# Patient Record
Sex: MALE | Race: WHITE | NOT HISPANIC OR LATINO | Employment: FULL TIME | URBAN - METROPOLITAN AREA
[De-identification: names, ages, dates, MRNs, and addresses within clinical notes are randomized per-mention and may not be internally consistent; named-entity substitution may affect disease eponyms.]

---

## 2017-02-10 ENCOUNTER — HOSPITAL ENCOUNTER (EMERGENCY)
Facility: HOSPITAL | Age: 27
Discharge: HOME | End: 2017-02-10
Attending: EMERGENCY MEDICINE | Admitting: EMERGENCY MEDICINE

## 2017-02-10 VITALS
HEART RATE: 76 BPM | RESPIRATION RATE: 18 BRPM | SYSTOLIC BLOOD PRESSURE: 122 MMHG | WEIGHT: 155 LBS | BODY MASS INDEX: 22.19 KG/M2 | DIASTOLIC BLOOD PRESSURE: 75 MMHG | HEIGHT: 70 IN | TEMPERATURE: 98.6 F | OXYGEN SATURATION: 96 %

## 2017-02-10 DIAGNOSIS — R56.9 GENERALIZED SEIZURE (HCC): Primary | ICD-10-CM

## 2017-02-10 PROCEDURE — 99284 EMERGENCY DEPT VISIT MOD MDM: CPT

## 2017-02-10 RX ORDER — LAMOTRIGINE 25 MG/1
TABLET ORAL
Qty: 140 TABLET | Refills: 0 | Status: SHIPPED | OUTPATIENT
Start: 2017-02-10 | End: 2017-02-24 | Stop reason: SDUPTHER

## 2017-02-10 RX ORDER — LAMOTRIGINE 25 MG/1
50 TABLET ORAL ONCE
Status: COMPLETED | OUTPATIENT
Start: 2017-02-10 | End: 2017-02-10

## 2017-02-10 RX ADMIN — LAMOTRIGINE 50 MG: 25 TABLET ORAL at 11:31

## 2017-02-10 NOTE — DISCHARGE INSTRUCTIONS
Return to the ED for any new or worsening concerns.  Follow up with Dr. Meng.   No driving for 90 days. No alcohol. Avoid stimulants.   Take your medications as prescribed.

## 2017-02-10 NOTE — ED PROVIDER NOTES
Subjective   HPI Comments: Richie Alvares is a 26 y.o.male who presents to the ED s/p seizure. Patient has hx seizure disorder for which he sees Dr. Meng, with his last appointment in July 2015. He states that his last seizure prior to today was 4 years ago and that he was previously on lamictal but stopped 1 year ago because he had not had a seizure in years. He reports that his seizures were previously triggered by stress and alcohol, but denies any alcoholism.  Last night, he went out drinking with friends. This morning, he awoke hungover and went to work, where he had a seizure. He states that he had difficulty focusing and felt light-headed prior to the episode. His coworkers described the patient during the episode as having his eyes closed, with head deviated to the left and right arm stiffened in the air, and jerking. He denies any other acute sx at this time.     Patient is a 26 y.o. male presenting with seizures.   History provided by:  Patient  Seizures   Seizure activity on arrival: no    Preceding symptoms: dizziness    Initial focality:  None  Episode characteristics: generalized shaking and stiffening    Return to baseline: yes    Timing:  Once  Number of seizures this episode:  1  PTA treatment:  None  History of seizures: yes    Date of most recent prior episode:  4 years ago  Severity:  Mild  Current therapy:  None      Review of Systems   Constitutional: Negative for chills, fatigue and fever.   HENT: Negative for congestion and sore throat.    Respiratory: Negative for shortness of breath.    Cardiovascular: Negative for chest pain.   Gastrointestinal: Negative for abdominal pain.   Genitourinary: Negative for dysuria.   Musculoskeletal: Negative for back pain.   Skin: Negative for rash.   Neurological: Positive for seizures and light-headedness. Negative for headaches.   Psychiatric/Behavioral: Negative for agitation and confusion.   All other systems reviewed and are negative.      Past  "Medical History   Diagnosis Date   • Seizures        Allergies   Allergen Reactions   • No Known Drug Allergy        History reviewed. No pertinent past surgical history.    History reviewed. No pertinent family history.    Social History     Social History   • Marital status: Single     Spouse name: N/A   • Number of children: N/A   • Years of education: N/A     Social History Main Topics   • Smoking status: Never Smoker   • Smokeless tobacco: None   • Alcohol use Yes      Comment: 1-2 a week or less   • Drug use: Yes     Special: Marijuana      Comment: \"NOT RECENTLY\"   • Sexual activity: Not Asked     Other Topics Concern   • None     Social History Narrative   • None         Objective   Physical Exam   Constitutional: He is oriented to person, place, and time. He appears well-developed and well-nourished. No distress.   HENT:   Head: Normocephalic and atraumatic.   Eyes: Conjunctivae are normal.   Neck: Trachea normal, normal range of motion and phonation normal. Neck supple. No JVD present.   Cardiovascular: Normal rate, regular rhythm and normal heart sounds.    Pulmonary/Chest: Effort normal and breath sounds normal. No respiratory distress.   Abdominal: Soft. There is no tenderness.   Musculoskeletal: Normal range of motion. He exhibits no edema.   Neurological: He is alert and oriented to person, place, and time. He has normal strength. Coordination normal.   Skin: Skin is warm and dry. He is not diaphoretic. No pallor.   Psychiatric: He has a normal mood and affect. His speech is normal and behavior is normal.   Nursing note and vitals reviewed.      Procedures         ED Course  ED Course   Comment By Time   Discussed case in detail with Dr. Meng, neurologist, who recommends restarting lamictal. JOVAN Orozco 02/10 1039       No results found for this or any previous visit (from the past 24 hour(s)).  Note: In addition to lab results from this visit, the labs listed above may include labs " taken at another facility or during a different encounter within the last 24 hours. Please correlate lab times with ED admission and discharge times for further clarification of the services performed during this visit.    No orders to display     Vitals:    02/10/17 1015 02/10/17 1030 02/10/17 1045 02/10/17 1100   BP: 126/71 117/79 109/53 122/75   Patient Position:       Pulse: 102 92 84 76   Resp:       Temp:       TempSrc:       SpO2: 99% 96% 97% 96%   Weight:       Height:         Medications   lamoTRIgine (LaMICtal) tablet 50 mg (50 mg Oral Given 2/10/17 1131)     ECG/EMG Results (last 24 hours)     ** No results found for the last 24 hours. **                      MDM    Final diagnoses:   Generalized seizure       Documentation assistance provided by tuan Orozco.  Information recorded by the tuan was done at my direction and has been verified and validated by me.     Komal Orozco  02/10/17 1025       Komal Orozco  02/10/17 1028       Komal Orozco  02/10/17 1045       Komal Orozco  02/10/17 1122       Delio Chambers MD  02/11/17 1900

## 2017-02-22 ENCOUNTER — OFFICE VISIT (OUTPATIENT)
Dept: NEUROLOGY | Facility: CLINIC | Age: 27
End: 2017-02-22

## 2017-02-22 VITALS
HEIGHT: 70 IN | BODY MASS INDEX: 21.33 KG/M2 | SYSTOLIC BLOOD PRESSURE: 118 MMHG | WEIGHT: 149 LBS | DIASTOLIC BLOOD PRESSURE: 60 MMHG

## 2017-02-22 DIAGNOSIS — G40.409 GRAND MAL SEIZURE DISORDER (HCC): Primary | ICD-10-CM

## 2017-02-22 PROCEDURE — 99213 OFFICE O/P EST LOW 20 MIN: CPT | Performed by: PHYSICIAN ASSISTANT

## 2017-02-22 NOTE — PROGRESS NOTES
Subjective     History of Present Illness   Pt with first seizure on 03/06/2013. After this he was seen by Dr. Ervin at  and underwent a brain MRI; reviewed disc from  from 3/22/13; agree normal. EEG at [facility] on 03/13/2013 was read as abnormal showing generalized spike and wave discharges became in bursts lasting less than 2 seconds. Recent CBC likewise was unremarkable.  The seizure occurred at work in the setting of recent increased stress and sleep deprivation (about 4 hours sleep). He recalls sitting at his desk working and recalls having a little trouble processing, and for example typing. Has had that feeling few times in past, when he was dehydrated and nearly passed out after running. The next thing he recalls is people talking to him and being loaded into ambulance. Seemed fine when seen in ER by family. The day prior to the seizure he had drunk about 24 ounces of beer which was not unusual for him. No TB or incontinence. Not really sore next day, but tired.   Born full term, no complications of pregnancy or delivery. When about [age], had very high fever, but no convulsion. No TBI with LOC. No CNS infection. No known family history of seizure, but father's side slightly less well known.  Started Dilantin, then planned to start LTG. no morning myoclonus.  At visit 10/8/13 was on LTG XR 400mg and tolerating well. Blood level 10/7/13 was 10. Very expensive.  Doing fine, and can't tell he's on the med. Reported no seizures or suspicion of seizures.  Switched to Lamictal 200 mg bid due to cost.  9/11/14: Bid dosing much cheaper. No problems with side effects. Good compliance -- misses no more than once/month. No seizure or prodrome.   Planning to travel for several months in . Noted: Doing well, great compliance. No change meds.  7/15: doing good. No seizures. [place] like the LTG at higher dose was keeping mind running, and so cut back dose to 200mg, after forgetting dose couple days then taking  "dose and noticing SEs. Takes this immediate release tab in the morning. Seizures have occurred in the very early morning hours. Does not want to go off medicine at this time but prefers this lower dose. Reports much healthier lifestyle now with good sleep and exercise and no excessive alcohol.      Today: Mr. Alvares notes that he had been seizure free for approximately four years and had discontinued Lamictal around one year ago with no issue. On 2/10, he was seen at [facility] ED following a recurrent seizure. He notes that he had difficulty focusing and felt light headed at work prior to this episode. He then had decreased consciousness. His co-workers noted associated right arm stiffening and convulsions. He noted associated fatigue following this episode. He denies associated urinary incontinence or tongue biting. He is unsure how long the episode lasted. Additionally, he noted that he had gone out with friends drinking the night before. He was restarted on Lamictal at the ED and is now titrating up to 100mg BID. He is tolerating this well.         The following portions of the patient's history were reviewed and updated as appropriate: allergies, current medications, past family history, past medical history, past social history, past surgical history and problem list.    Review of Systems   Constitutional: Negative.    HENT: Negative.    Eyes: Negative.    Respiratory: Negative.    Cardiovascular: Negative.    Gastrointestinal: Negative.    Endocrine: Negative.    Genitourinary: Negative.    Musculoskeletal: Negative.    Skin: Negative.    Allergic/Immunologic: Negative.    Hematological: Negative.    Psychiatric/Behavioral: Negative.        Objective     Visit Vitals   • /60   • Ht 70\" (177.8 cm)   • Wt 149 lb (67.6 kg)   • BMI 21.38 kg/m2       General appearance today is normal.         Physical Exam   Neurological: He has normal strength. He has a normal Finger-Nose-Finger Test.   Psychiatric: His speech is " normal.        Neurologic Exam     Mental Status   Attention: normal.   Speech: speech is normal   Level of consciousness: alert  Knowledge: good.   Normal comprehension.     Cranial Nerves   Cranial nerves II through XII intact.     Motor Exam   Muscle bulk: normal  Overall muscle tone: normal    Strength   Strength 5/5 throughout.     Sensory Exam   Light touch normal.     Gait, Coordination, and Reflexes     Coordination   Finger to nose coordination: normal    Tremor   Resting tremor: absent        Assessment/Plan   Richie was seen today for seizures.    Diagnoses and all orders for this visit:    Grand mal seizure disorder          Discussion/Summary   Richie Alvares returns to clinic today with a history of epilepsy. I again reviewed his current status and treatment options. After discussing potential treatment options, it was elected to continue on Lamictal, titrating up to 100mg BID. He will be unable to drive for 90 days following seizure per the [place] driving law. He will then follow up in 6 months, or sooner if needed.       I spent 15 minutes with the patient. I spent 75 percent of this time counseling and discussing diagnosis, prognosis, diagnostic testing, evaluation, treatment options and management.    As part of this visit I discussed the history with the patient .      Sharon Tom PA-C

## 2017-02-24 ENCOUNTER — TELEPHONE (OUTPATIENT)
Dept: NEUROLOGY | Facility: CLINIC | Age: 27
End: 2017-02-24

## 2017-02-24 RX ORDER — LAMOTRIGINE 25 MG/1
100 TABLET ORAL 2 TIMES DAILY
Qty: 60 TABLET | Refills: 11 | Status: SHIPPED | OUTPATIENT
Start: 2017-02-24 | End: 2018-10-03

## 2017-02-24 NOTE — TELEPHONE ENCOUNTER
Called and lvm with pt letting him know that Sharon had called in the script for Lamictal 100 mgs to be taken BID but to make sure he is titrated up before he starts the 100 mgs and follow hospital orders.

## 2017-02-24 NOTE — TELEPHONE ENCOUNTER
----- Message from Sharon Tom PA-C sent at 2/24/2017 12:01 PM EST -----  Regarding: RE: medication correction  I sent in 100mg BID. Make sure to let him know to only begin taking this dose after he is fulling titrated up to this amount. Thanks!      ----- Message -----     From: Leora Tyler CMA     Sent: 2/24/2017  10:56 AM       To: Sharon Tom PA-C  Subject: medication correction                                ----- Message -----     From: Dale Watson MA     Sent: 2/24/2017  10:54 AM       To: Leora Tyler CMA    Pt called. Stated that a prescription was to be called into [facility] pharmacy. He is currently taking 25mg 4tabs BID.     Pt would like CB once this has been done    Thanks

## 2017-02-28 ENCOUNTER — TELEPHONE (OUTPATIENT)
Dept: NEUROLOGY | Facility: CLINIC | Age: 27
End: 2017-02-28

## 2017-02-28 NOTE — TELEPHONE ENCOUNTER
Pt called and states lamotrigine 100 mg BID Rx wasn't received by [facility] pharmacy. [facility]

## 2017-03-29 RX ORDER — LEVETIRACETAM 500 MG/1
TABLET ORAL
Qty: 60 TABLET | Refills: 3 | Status: SHIPPED | OUTPATIENT
Start: 2017-03-29 | End: 2017-08-08 | Stop reason: SDUPTHER

## 2017-03-30 ENCOUNTER — TELEPHONE (OUTPATIENT)
Dept: NEUROLOGY | Facility: CLINIC | Age: 27
End: 2017-03-30

## 2017-03-30 NOTE — TELEPHONE ENCOUNTER
Spoke with patient and faxed over the titration schedule to him. Patient to call with any questions about changing medications. ADRIEL

## 2017-03-30 NOTE — TELEPHONE ENCOUNTER
"----- Message from Geneva Meng MD sent at 3/29/2017  4:25 PM EDT -----  Regarding: RE: Medication issue  Titration written out, script sent in  ----- Message -----     From: Gisselle Cisneros CMA     Sent: 3/29/2017  11:13 AM       To: Geneva Meng MD  Subject: RE: Medication issue                             Patient called back and stated he had not been on Keppra before. I explained that a new prescription would be sent in and that we could fax him over the titration schedule to follow. AG   ----- Message -----     From: Geneva Meng MD     Sent: 3/28/2017   8:59 AM       To: Gisselle Cisneros CMA  Subject: RE: Medication issue                             We can change medications. As far as I know, he has never taken Keppra. Can you check with him if this is correct? If so, I will send in script and write out titration schedule.  ----- Message -----     From: Gisselle Cisneros CMA     Sent: 3/27/2017   1:25 PM       To: Geneva Meng MD  Subject: Medication issue                                 Having mood swings ( up and down) \"on edge\" seems like. Denies depression or suicidal feelings. Feels \"off\" wants to know if there is another medication option for him. He has had the same problem with Lamictal in the past.          [phone]        "

## 2017-08-08 RX ORDER — LEVETIRACETAM 500 MG/1
TABLET ORAL
Qty: 60 TABLET | Refills: 3 | Status: SHIPPED | OUTPATIENT
Start: 2017-08-08 | End: 2017-10-03 | Stop reason: SDUPTHER

## 2017-10-03 ENCOUNTER — OFFICE VISIT (OUTPATIENT)
Dept: NEUROLOGY | Facility: CLINIC | Age: 27
End: 2017-10-03

## 2017-10-03 VITALS
WEIGHT: 150 LBS | SYSTOLIC BLOOD PRESSURE: 120 MMHG | HEIGHT: 70 IN | DIASTOLIC BLOOD PRESSURE: 75 MMHG | BODY MASS INDEX: 21.47 KG/M2

## 2017-10-03 DIAGNOSIS — G40.309 EPILEPSY, GENERALIZED, CONVULSIVE (HCC): Primary | ICD-10-CM

## 2017-10-03 PROCEDURE — 99214 OFFICE O/P EST MOD 30 MIN: CPT | Performed by: PSYCHIATRY & NEUROLOGY

## 2017-10-03 RX ORDER — LEVETIRACETAM 500 MG/1
TABLET ORAL
Qty: 60 TABLET | Refills: 11 | Status: SHIPPED | OUTPATIENT
Start: 2017-10-03 | End: 2018-07-11 | Stop reason: SDUPTHER

## 2017-10-03 NOTE — PROGRESS NOTES
Subjective   Richie Alvares is a 27 y.o. male.     History of Present Illness   Pt with first seizure on 03/06/2013. First seen by Dr. Ervin at . Brain MRI reviewed disc from 3/13: normal. EEG at [facility] on 03/13/2013 was read as abnormal showing generalized spike and wave discharges in bursts lasting less than 2 seconds.   The seizure occurred in setting of recent increased stress and sleep deprivation. Immediately before recalls having a little trouble processing. Has had that feeling few times in past, when he was dehydrated and nearly passed out after running. Day prior to the seizure he had drunk about 24 ounces of beer which was not unusual for him. No TB or incontinence.  Born full term, no complications of pregnancy or delivery. When about [age], had very high fever, but no convulsion. No TBI with LOC. No CNS infection. No known family history of seizure, but father's side slightly less well known.  Started Dilantin, then planned to start LTG. no morning myoclonus.  At visit 10/8/13 was on LTG XR 400mg and tolerating well. Blood level 10/7/13 was 10. Very expensive. Switched to Lamictal 200 mg bid due to cost.  9/11/14: Good compliance -- misses no more than once/month. No seizure or prodrome.   7/15: No seizures. Felt like the LTG at higher dose was keeping mind running, and so cut back dose to 200mg, after forgetting dose couple days then taking dose and noticing SEs. Takes this immediate release tab in the morning. Seizures have occurred in the very early morning hours. Does not want to go off medicine at this time but prefers this lower dose. Reports much healthier lifestyle now with good sleep and exercise and no excessive alcohol.    2/17: was seizure free for approximately four years and had discontinued Lamictal around one year ago with no issue. On 2/10, he was seen at [facility] ED following a recurrent seizure. He notes that he had difficulty focusing and felt light headed at work prior to this  "episode. Co-workers noted associated right arm stiffening and convulsions, duration unclear. Additionally, he noted that he had gone out with friends drinking the night before. He was restarted on Lamictal at the ED and titrating up to 100mg BID.  Today: phoned in March, mood swings on LTG, planned change to LVT. Doing well with LVT 500mg bid. No seizures but as in past, couple times after running and dehydrated felt hard to think, but not like before seizure. General health has been OK. Has been working out a lot. \"Tweaked\" back with exercise recently. Takes fish oil and OTC joint med bid so feels he can deal with bid dosing of LVT. Denies any significant mood or other SEs.     The following portions of the patient's history were reviewed and updated as appropriate: allergies, current medications, past family history, past medical history, past social history, past surgical history and problem list.    Review of Systems   Constitutional: Negative for fever and unexpected weight change.   Respiratory: Negative for cough and shortness of breath.    Cardiovascular: Negative for chest pain.   Musculoskeletal: Positive for back pain.   Neurological: Negative for seizures.   Psychiatric/Behavioral: Negative for confusion and dysphoric mood. The patient is not nervous/anxious.        Objective   Physical Exam   Constitutional: He is oriented to person, place, and time. He appears well-developed and well-nourished.   HENT:   Head: Normocephalic and atraumatic.   Eyes: EOM are normal. Pupils are equal, round, and reactive to light.   Pulmonary/Chest: Effort normal.   Musculoskeletal: Normal range of motion.   Neurological: He is oriented to person, place, and time. He has a normal Finger-Nose-Finger Test and a normal Heel to Shin Test. Gait normal.   Skin: Skin is warm and dry.   Psychiatric: His speech is normal.   Nursing note and vitals reviewed.      Neurologic Exam     Mental Status   Oriented to person, place, and " time.   Speech: speech is normal   Level of consciousness: alert  Knowledge: consistent with education.   Able to name object. Normal comprehension.     Cranial Nerves     CN II   Visual fields full to confrontation.     CN III, IV, VI   Pupils are equal, round, and reactive to light.  Extraocular motions are normal.     CN VII   Facial expression full, symmetric.     CN IX, X   CN IX normal.   CN X normal.   Palate: symmetric    CN XI   CN XI normal.     CN XII   CN XII normal.     Motor Exam   Muscle bulk: normal  Right arm pronator drift: absent  Left arm pronator drift: absent    Strength   Strength 5/5 except as noted.     Sensory Exam   Light touch normal.     Gait, Coordination, and Reflexes     Gait  Gait: normal    Coordination   Finger to nose coordination: normal  Heel to shin coordination: normal    Tremor   Resting tremor: absent  Intention tremor: absent  Action tremor: absent      Assessment/Plan   Richie was seen today for seizures.    Diagnoses and all orders for this visit:    Epilepsy, generalized, convulsive    Other orders  -     levETIRAcetam (KEPPRA) 500 MG tablet; Take one tab twice daily    Discussion/Summary:  Doing well. Long discussion re: compliance, med alternatives, mood effects of AEDs, seizure classification and prognosis, plan for sleep deprived EEG before taper in future, carrying LVT with, sx of prodrome and response, etc.  25 minutes face to face, 15 minutes spent in discussion as above.   Return in about 1 year (around 10/3/2018).

## 2018-07-11 ENCOUNTER — TELEPHONE (OUTPATIENT)
Dept: NEUROLOGY | Facility: CLINIC | Age: 28
End: 2018-07-11

## 2018-07-11 RX ORDER — LEVETIRACETAM 500 MG/1
TABLET ORAL
Qty: 180 TABLET | Refills: 1 | Status: SHIPPED | OUTPATIENT
Start: 2018-07-11 | End: 2018-10-03 | Stop reason: SDUPTHER

## 2018-07-11 NOTE — TELEPHONE ENCOUNTER
LVM with PT to inform him that KEPPRA has been refilled as 90day supply  & sent to pharm on file.

## 2018-07-11 NOTE — TELEPHONE ENCOUNTER
----- Message from Luz Henning sent at 7/11/2018  8:50 AM EDT -----  Contact: PATIENT  [facility] [place]    PATIENTS KEPRA IS NO LONGER COVERED IN 30DAY SUPPLY, WILL NEED AN RX FOR A 90 DAY SUPPLY.     THANKS!

## 2018-10-03 ENCOUNTER — OFFICE VISIT (OUTPATIENT)
Dept: NEUROLOGY | Facility: CLINIC | Age: 28
End: 2018-10-03

## 2018-10-03 VITALS
HEIGHT: 70 IN | WEIGHT: 150 LBS | BODY MASS INDEX: 21.47 KG/M2 | SYSTOLIC BLOOD PRESSURE: 117 MMHG | HEART RATE: 58 BPM | DIASTOLIC BLOOD PRESSURE: 62 MMHG

## 2018-10-03 DIAGNOSIS — G40.309 CRYPTOGENIC GENERALIZED EPILEPSY (HCC): Primary | ICD-10-CM

## 2018-10-03 PROCEDURE — 99213 OFFICE O/P EST LOW 20 MIN: CPT | Performed by: PSYCHIATRY & NEUROLOGY

## 2018-10-03 RX ORDER — LEVETIRACETAM 500 MG/1
TABLET ORAL
Qty: 180 TABLET | Refills: 3 | Status: SHIPPED | OUTPATIENT
Start: 2018-10-03 | End: 2019-11-11 | Stop reason: SDUPTHER

## 2018-10-03 NOTE — PROGRESS NOTES
Subjective   Richie Alvares is a 28 y.o. male.     Chief Complaint   Patient presents with   • Seizures     f/u       History of Present Illness     Pt with first seizure 3/13. First seen by Dr. Ervin at . Brain MRI reviewed disc from 3/13: normal. EEG at [facility]/  3/13 showed generalized spike and wave discharges in bursts.   The seizure occurred in setting of recent increased stress and sleep deprivation. Immediately before recalls having a little trouble processing. Had same feeling few times in past, when he was dehydrated and nearly passed out after running. Day prior to the seizure he had drunk about 24 ounces of beer which was not unusual for him. No TB or incontinence.  Born full term, no complications of pregnancy or delivery. When about [age], had very high fever, but no convulsion. No TBI with LOC. No CNS infection. No known family history of seizure, but father's side slightly less well known.  Started Dilantin, then planned to start LTG. no morning myoclonus.  At visit 10/8/13 was on LTG XR 400mg and tolerating well. Blood level 10/7/13 was 10. Very expensive. Switched to Lamictal 200 mg bid due to cost.  9/14: Good compliance -- misses no more than once/month. No seizure or prodrome.   7/15: No seizures. Felt like the LTG at higher dose was keeping mind running, and so cut back dose to 200mg, after forgetting dose couple days then taking dose and noticing SEs. Takes this immediate release tab in the morning. Seizures have occurred in the very early morning hours. Does not want to go off medicine at this time but prefers this lower dose.     2/17: was seizure free for approximately four years, discontinued Lamictal around one year ago with no issue. On 2/10, he was seen at [facility] ED following a recurrent seizure. He notes that he had difficulty focusing and felt LH right before. Co-workers reported right arm stiffening and convulsions, duration unclear. Had gone out with friends drinking the night  "before. Restarted Lamictal at the ED and titrating up to 100mg BID.  10/17: phoned in March, mood swings on LTG, planned change to LVT. Doing well with LVT 500mg bid. No seizures but as in past, couple times after running and dehydrated felt hard to think, but not like before seizure. Denies any significant mood or other SEs.   Today: doing well. Exercising regularly. No problems with LVT 500mg bid.  No sx of seizure at all. Sometimes sleep deprived, sometimes drinks alcohol, no near seizures. Now sees Dr Chambers at .     Allergies   Allergen Reactions   • No Known Drug Allergy        Current Outpatient Prescriptions on File Prior to Visit   Medication Sig Dispense Refill   • [DISCONTINUED] lamoTRIgine (LaMICtal) 25 MG tablet Take 4 tablets by mouth 2 (Two) Times a Day. 60 tablet 11   • [DISCONTINUED] levETIRAcetam (KEPPRA) 500 MG tablet Take one tab twice daily 180 tablet 1     No current facility-administered medications on file prior to visit.        Past Medical History:   Diagnosis Date   • Seizures (CMS/HCC)        No past surgical history on file.    Social History     Social History   • Marital status: Single     Spouse name: N/A   • Number of children: N/A   • Years of education: N/A     Occupational History   • Not on file.     Social History Main Topics   • Smoking status: Never Smoker   • Smokeless tobacco: Not on file   • Alcohol use Yes      Comment: 1-2 a week or less   • Drug use: Yes     Types: Marijuana      Comment: \"NOT RECENTLY\"   • Sexual activity: Not on file     Other Topics Concern   • Not on file     Social History Narrative   • No narrative on file       Review of Systems   Constitutional: Negative for diaphoresis, fatigue and fever.   Respiratory: Negative for shortness of breath.    Cardiovascular: Negative for chest pain and palpitations.   Gastrointestinal: Negative for abdominal pain, nausea and vomiting.   Musculoskeletal: Negative for back pain and neck pain.   Neurological: " "Negative for dizziness, syncope, weakness, light-headedness and headaches.   Psychiatric/Behavioral: Negative for confusion.       Objective   Blood pressure 117/62, pulse 58, height 177.8 cm (70\"), weight 68 kg (150 lb).    Physical Exam   Constitutional: He is oriented to person, place, and time. He appears well-developed and well-nourished.   HENT:   Head: Normocephalic and atraumatic.   Eyes: Pupils are equal, round, and reactive to light. EOM are normal.   Pulmonary/Chest: Effort normal.   Musculoskeletal: Normal range of motion.   Neurological: He is oriented to person, place, and time. He has a normal Finger-Nose-Finger Test and a normal Heel to Shin Test. Gait normal.   Skin: Skin is warm and dry.   Psychiatric: He has a normal mood and affect. His speech is normal and behavior is normal. Judgment and thought content normal.   Nursing note and vitals reviewed.      Neurologic Exam     Mental Status   Oriented to person, place, and time.   Speech: speech is normal   Level of consciousness: alert  Knowledge: consistent with education.   Able to name object. Normal comprehension.     Cranial Nerves     CN II   Visual fields full to confrontation.     CN III, IV, VI   Pupils are equal, round, and reactive to light.  Extraocular motions are normal.     CN VII   Facial expression full, symmetric.     CN IX, X   CN IX normal.   CN X normal.   Palate: symmetric    CN XI   CN XI normal.     CN XII   CN XII normal.     Motor Exam   Muscle bulk: normal  Right arm pronator drift: absent  Left arm pronator drift: absent    Strength   Strength 5/5 except as noted.     Sensory Exam   Light touch normal.     Gait, Coordination, and Reflexes     Gait  Gait: normal    Coordination   Finger to nose coordination: normal  Heel to shin coordination: normal    Tremor   Resting tremor: absent  Intention tremor: absent  Action tremor: absent      Assessment/Plan     Richie was seen today for seizures.    Diagnoses and all orders for " this visit:    Cryptogenic generalized epilepsy (CMS/HCC)    Other orders  -     levETIRAcetam (KEPPRA) 500 MG tablet; Take one tab twice daily      Discussion/Summary:  .I spent  17  minutes face to face with the patient, with 10 minutes spent  counselling:    Reviewed tx options; can check with pharmacy re: cost of LVT XR. Discussed compliance, taking extra tab if any sx, or eg vomiting. Discussed length of tx, recommend at least going as long as has previously been sz free (4+ years), and need to taper if he decides to discontinue.   Return in about 1 year (around 10/3/2019).

## 2019-11-11 ENCOUNTER — OFFICE VISIT (OUTPATIENT)
Dept: NEUROLOGY | Facility: CLINIC | Age: 29
End: 2019-11-11

## 2019-11-11 VITALS
HEIGHT: 70 IN | SYSTOLIC BLOOD PRESSURE: 122 MMHG | WEIGHT: 153 LBS | DIASTOLIC BLOOD PRESSURE: 80 MMHG | BODY MASS INDEX: 21.9 KG/M2 | OXYGEN SATURATION: 98 % | HEART RATE: 74 BPM

## 2019-11-11 DIAGNOSIS — G40.309 CRYPTOGENIC GENERALIZED EPILEPSY (HCC): Primary | ICD-10-CM

## 2019-11-11 PROCEDURE — 99214 OFFICE O/P EST MOD 30 MIN: CPT | Performed by: PSYCHIATRY & NEUROLOGY

## 2019-11-11 RX ORDER — LEVETIRACETAM 500 MG/1
TABLET ORAL
Qty: 180 TABLET | Refills: 3 | Status: SHIPPED | OUTPATIENT
Start: 2019-11-11 | End: 2020-11-11 | Stop reason: SDUPTHER

## 2019-11-11 NOTE — PROGRESS NOTES
Subjective:    CC: Richie Alvares is seen today for Seizures       HPI:  Current visit-this is a patient previously seen by Dr. Meng for seizures.  Patient had his first seizure in 2013.  With that episode he was under a lot of stress and was also sleep deprived.  Had been drinking 3 to 4 glasses of beer the day before.  Prior to the episode he felt slightly lightheaded and had trouble processing things.  After that he had whole body shaking.  Denies having any tongue bite or incontinence.  He was initially started on Dilantin and subsequently switched to Lamictal but reduced his dose to once a day as he was having some difficulty focusing.  Had his second seizure in 2017 in the early morning.  At that point he was completely off of Lamictal as he had been seizure-free.  After that Dr. Meng restarted lamotrigine however patient started having side effects hence she switched him to Keppra 500 mg twice a day that he has tolerated well.  Has not had any more seizures since 2017.  Does report to getting adequate sleep now.  Also avoids drinking alcohol but sometimes does binge drinking for example this last Halloween he must have had 5-6 beers.  He had an MRI brain in the past at  that was normal and an EEG also done in 2013 that showed generalized spike and wave discharges and bursts.  Denies having any myoclonic jerks or episodes of staring.  There is no family history of seizures.  He also denies any febrile seizures as a child however did have a high fever at age 4.  Of note- I reviewed Dr. Meng's notes as follows-    Pt with first seizure 3/13. First seen by Dr. Ervin at . Brain MRI reviewed disc from 3/13: normal. EEG at [facility]/  3/13 showed generalized spike and wave discharges in bursts.   The seizure occurred in setting of recent increased stress and sleep deprivation. Immediately before recalls having a little trouble processing. Had same feeling few times in past, when he was dehydrated  and nearly passed out after running. Day prior to the seizure he had drunk about 24 ounces of beer which was not unusual for him. No TB or incontinence.  Born full term, no complications of pregnancy or delivery. When about [age], had very high fever, but no convulsion. No TBI with LOC. No CNS infection. No known family history of seizure, but father's side slightly less well known.  Started Dilantin, then planned to start LTG. no morning myoclonus.  At visit 10/8/13 was on LTG XR 400mg and tolerating well. Blood level 10/7/13 was 10. Very expensive. Switched to Lamictal 200 mg bid due to cost.  9/14: Good compliance -- misses no more than once/month. No seizure or prodrome.   7/15: No seizures. Felt like the LTG at higher dose was keeping mind running, and so cut back dose to 200mg, after forgetting dose couple days then taking dose and noticing SEs. Takes this immediate release tab in the morning. Seizures have occurred in the very early morning hours. Does not want to go off medicine at this time but prefers this lower dose.     2/17: was seizure free for approximately four years, discontinued Lamictal around one year ago with no issue. On 2/10, he was seen at [facility] ED following a recurrent seizure. He notes that he had difficulty focusing and felt LH right before. Co-workers reported right arm stiffening and convulsions, duration unclear. Had gone out with friends drinking the night before. Restarted Lamictal at the ED and titrating up to 100mg BID.  10/17: phoned in March, mood swings on LTG, planned change to LVT. Doing well with LVT 500mg bid. No seizures but as in past, couple times after running and dehydrated felt hard to think, but not like before seizure. Denies any significant mood or other SEs.   Today: doing well. Exercising regularly. No problems with LVT 500mg bid.  No sx of seizure at all. Sometimes sleep deprived, sometimes drinks alcohol, no near seizures. Now sees Dr Chambers at .     The following  "portions of the patient's history were reviewed today and updated as of 11/11/2019  : allergies, current medications, past family history, past medical history, past social history, past surgical history and problem list  These document will be scanned to patient's chart.      Current Outpatient Medications:   •  levETIRAcetam (KEPPRA) 500 MG tablet, Take one tab twice daily, Disp: 180 tablet, Rfl: 3   Past Medical History:   Diagnosis Date   • Seizures (CMS/HCC)       No past surgical history on file.   No family history on file.   Social History     Socioeconomic History   • Marital status: Single     Spouse name: Not on file   • Number of children: Not on file   • Years of education: Not on file   • Highest education level: Not on file   Tobacco Use   • Smoking status: Never Smoker   Substance and Sexual Activity   • Alcohol use: Yes     Comment: 1-2 a week or less   • Drug use: Yes     Types: Marijuana     Comment: \"NOT RECENTLY\"     Review of Systems   All other systems reviewed and are negative.      Objective:    /80   Pulse 74   Ht 177.8 cm (70\")   Wt 69.4 kg (153 lb)   SpO2 98%   BMI 21.95 kg/m²     Neurology Exam:    General apperance: NAD.     Mental status: Alert, awake and oriented to time place and person.    Recent and Remote memory: Intact.    Attention span and Concentration: Normal.     Language and Speech: Intact- No dysarthria.    Fluency, Naming , Repitition and Comprehension:  Intact    Cranial Nerves:   CN II: Visual fields are full. Intact. Fundi - Normal, No papillederma, Pupils - TEAGAN  CN III, IV and VI: Extraocular movements are intact. Normal saccades.   CN V: Facial sensation is intact.   CN VII: Muscles of facial expression reveal no asymmetry. Intact.   CN VIII: Hearing is intact. Whispered voice intact.   CN IX and X: Palate elevates symmetrically. Intact  CN XI: Shoulder shrug is intact.   CN XII: Tongue is midline without evidence of atrophy or fasciculation. "     Ophthalmoscopic exam of optic disc-normal    Motor:  Right UE muscle strength 5/5. Normal tone.     Left UE muscle strength 5/5. Normal tone.      Right LE muscle strength5/5. Normal tone.     Left LE muscle strength 5/5. Normal tone.      Sensory: Normal light touch, vibration and pinprick sensation bilaterally.    DTRs: 2+ bilaterally in upper and lower extremities.    Babinski: Negative bilaterally.    Co-ordination: Normal finger-to-nose, heel to shin B/L.    Rhomberg: Negative.    Gait: Normal.    Cardiovascular: Regular rate and rhythm without murmur, gallop or rub.    Assessment and Plan:  1. Cryptogenic generalized epilepsy (CMS/HCC)  Patient has generalized epilepsy  I have asked him to continue taking Keppra 500 mg twice a day.  I have also counseled him on good sleep hygiene, avoiding binge drinking and taking an extra dose of Keppra if he ever felt an episode coming on or was sick/sleep deprived       Return in about 6 months (around 5/11/2020).         Tracie Moran MD

## 2020-11-11 ENCOUNTER — OFFICE VISIT (OUTPATIENT)
Dept: NEUROLOGY | Facility: CLINIC | Age: 30
End: 2020-11-11

## 2020-11-11 VITALS
SYSTOLIC BLOOD PRESSURE: 104 MMHG | DIASTOLIC BLOOD PRESSURE: 68 MMHG | BODY MASS INDEX: 22.6 KG/M2 | OXYGEN SATURATION: 98 % | TEMPERATURE: 97.8 F | HEIGHT: 70 IN | HEART RATE: 73 BPM | WEIGHT: 157.9 LBS

## 2020-11-11 DIAGNOSIS — G40.309 GENERALIZED EPILEPSY (HCC): Primary | ICD-10-CM

## 2020-11-11 PROCEDURE — 99213 OFFICE O/P EST LOW 20 MIN: CPT | Performed by: PSYCHIATRY & NEUROLOGY

## 2020-11-11 RX ORDER — MULTIPLE VITAMINS W/ MINERALS TAB 9MG-400MCG
1 TAB ORAL DAILY
COMMUNITY

## 2020-11-11 RX ORDER — LEVETIRACETAM 500 MG/1
TABLET ORAL
Qty: 180 TABLET | Refills: 3 | Status: SHIPPED | OUTPATIENT
Start: 2020-11-11 | End: 2021-11-05

## 2020-11-11 NOTE — PROGRESS NOTES
Subjective:    CC: Richie Alvares is seen today for Cryptogenic gereralized epilepsy       HPI:  Current visit- Patient states he has not had any seizures since his last visit.  In fact his last grand mal seizure was in 2017.  He continues to take Keppra 500 mg twice a day.  He may take an extra dose if he feels jittery in the morning.  He moved into a new house 2 months ago and was stressed out as a result of that but did not feel any episodes come on.  Also denies having any episodes with staring or body jerks.  He does refrain from binge drinking now.    Last visit-this is a patient previously seen by Dr. Meng for seizures.  Patient had his first seizure in 2013.  With that episode he was under a lot of stress and was also sleep deprived.  Had been drinking 3 to 4 glasses of beer the day before.  Prior to the episode he felt slightly lightheaded and had trouble processing things.  After that he had whole body shaking.  Denies having any tongue bite or incontinence.  He was initially started on Dilantin and subsequently switched to Lamictal but reduced his dose to once a day as he was having some difficulty focusing.  Had his second seizure in 2017 in the early morning.  At that point he was completely off of Lamictal as he had been seizure-free.  After that Dr. Meng restarted lamotrigine however patient started having side effects hence she switched him to Keppra 500 mg twice a day that he has tolerated well.  Has not had any more seizures since 2017.  Does report to getting adequate sleep now.  Also avoids drinking alcohol but sometimes does binge drinking for example this last Halloween he must have had 5-6 beers.  He had an MRI brain in the past at  that was normal and an EEG also done in 2013 that showed generalized spike and wave discharges and bursts.  Denies having any myoclonic jerks or episodes of staring.  There is no family history of seizures.  He also denies any febrile seizures as a child  however did have a high fever at age 4.  Of note- I reviewed Dr. Meng's notes as follows-    Pt with first seizure 3/13. First seen by Dr. Ervin at . Brain MRI reviewed disc from 3/13: normal. EEG at [facility]/  3/13 showed generalized spike and wave discharges in bursts.   The seizure occurred in setting of recent increased stress and sleep deprivation. Immediately before recalls having a little trouble processing. Had same feeling few times in past, when he was dehydrated and nearly passed out after running. Day prior to the seizure he had drunk about 24 ounces of beer which was not unusual for him. No TB or incontinence.  Born full term, no complications of pregnancy or delivery. When about [age], had very high fever, but no convulsion. No TBI with LOC. No CNS infection. No known family history of seizure, but father's side slightly less well known.  Started Dilantin, then planned to start LTG. no morning myoclonus.  At visit 10/8/13 was on LTG XR 400mg and tolerating well. Blood level 10/7/13 was 10. Very expensive. Switched to Lamictal 200 mg bid due to cost.  9/14: Good compliance -- misses no more than once/month. No seizure or prodrome.   7/15: No seizures. Felt like the LTG at higher dose was keeping mind running, and so cut back dose to 200mg, after forgetting dose couple days then taking dose and noticing SEs. Takes this immediate release tab in the morning. Seizures have occurred in the very early morning hours. Does not want to go off medicine at this time but prefers this lower dose.     2/17: was seizure free for approximately four years, discontinued Lamictal around one year ago with no issue. On 2/10, he was seen at [facility] ED following a recurrent seizure. He notes that he had difficulty focusing and felt LH right before. Co-workers reported right arm stiffening and convulsions, duration unclear. Had gone out with friends drinking the night before. Restarted Lamictal at the ED and titrating up  "to 100mg BID.  10/17: phoned in March, mood swings on LTG, planned change to LVT. Doing well with LVT 500mg bid. No seizures but as in past, couple times after running and dehydrated felt hard to think, but not like before seizure. Denies any significant mood or other SEs.   Today: doing well. Exercising regularly. No problems with LVT 500mg bid.  No sx of seizure at all. Sometimes sleep deprived, sometimes drinks alcohol, no near seizures. Now sees Dr Chambers at .     The following portions of the patient's history were reviewed today and updated as of 11/11/2019  : allergies, current medications, past family history, past medical history, past social history, past surgical history and problem list  These document will be scanned to patient's chart.      Current Outpatient Medications:   •  Inulin (FIBER CHOICE PO), Fiber (dextrin) 3 gram/3.5 gram oral powder  Take by oral route., Disp: , Rfl:   •  levETIRAcetam (KEPPRA) 500 MG tablet, Take one tab twice daily, Disp: 180 tablet, Rfl: 3  •  multivitamin with minerals tablet tablet, Take 1 tablet by mouth Daily., Disp: , Rfl:    Past Medical History:   Diagnosis Date   • Seizures (CMS/HCC)       Past Surgical History:   Procedure Laterality Date   • WISDOM TOOTH EXTRACTION        Family History   Problem Relation Age of Onset   • Dementia Maternal Grandmother    • Dementia Maternal Grandfather       Social History     Socioeconomic History   • Marital status: Single     Spouse name: Not on file   • Number of children: Not on file   • Years of education: Not on file   • Highest education level: Not on file   Tobacco Use   • Smoking status: Never Smoker   • Smokeless tobacco: Never Used   Substance and Sexual Activity   • Alcohol use: Yes     Comment: 1-2 a week or less   • Drug use: Yes     Types: Marijuana     Comment: \"NOT RECENTLY\"     Review of Systems   All other systems reviewed and are negative.      Objective:    /68   Pulse 73   Temp 97.8 °F (36.6 °C) " "  Ht 177.8 cm (70\")   Wt 71.6 kg (157 lb 14.4 oz)   SpO2 98%   BMI 22.66 kg/m²     Neurology Exam:    General apperance: NAD.     Mental status: Alert, awake and oriented to time place and person.    Recent and Remote memory: Intact.    Attention span and Concentration: Normal.     Language and Speech: Intact- No dysarthria.    Fluency, Naming , Repitition and Comprehension:  Intact    Cranial Nerves:   CN II: Visual fields are full. Intact. Fundi - Normal, No papillederma, Pupils - TEAGAN  CN III, IV and VI: Extraocular movements are intact. Normal saccades.   CN V: Facial sensation is intact.   CN VII: Muscles of facial expression reveal no asymmetry. Intact.   CN VIII: Hearing is intact. Whispered voice intact.   CN IX and X: Palate elevates symmetrically. Intact  CN XI: Shoulder shrug is intact.   CN XII: Tongue is midline without evidence of atrophy or fasciculation.     Ophthalmoscopic exam of optic disc-normal    Motor:  Right UE muscle strength 5/5. Normal tone.     Left UE muscle strength 5/5. Normal tone.      Right LE muscle strength5/5. Normal tone.     Left LE muscle strength 5/5. Normal tone.      Sensory: Normal light touch, vibration and pinprick sensation bilaterally.    DTRs: 2+ bilaterally in upper and lower extremities.    Babinski: Negative bilaterally.    Co-ordination: Normal finger-to-nose, heel to shin B/L.    Rhomberg: Negative.    Gait: Normal.    Cardiovascular: Regular rate and rhythm without murmur, gallop or rub.    Assessment and Plan:  1. Cryptogenic generalized epilepsy (CMS/HCC)  Patient has generalized epilepsy  I have asked him to continue taking Keppra 500 mg twice a day.  I have also counseled him on good sleep hygiene, avoiding binge drinking and taking an extra dose of Keppra if he ever felt an episode coming on or was sick/sleep deprived       Return in about 1 year (around 11/11/2021).         Tracie Moran MD    "

## 2021-02-24 ENCOUNTER — IMMUNIZATION (OUTPATIENT)
Dept: VACCINE CLINIC | Facility: HOSPITAL | Age: 31
End: 2021-02-24

## 2021-02-24 PROCEDURE — 0001A: CPT | Performed by: INTERNAL MEDICINE

## 2021-02-24 PROCEDURE — 91300 HC SARSCOV02 VAC 30MCG/0.3ML IM: CPT | Performed by: INTERNAL MEDICINE

## 2021-03-17 ENCOUNTER — IMMUNIZATION (OUTPATIENT)
Dept: VACCINE CLINIC | Facility: HOSPITAL | Age: 31
End: 2021-03-17

## 2021-03-17 PROCEDURE — 0002A: CPT | Performed by: INTERNAL MEDICINE

## 2021-03-17 PROCEDURE — 91300 HC SARSCOV02 VAC 30MCG/0.3ML IM: CPT | Performed by: INTERNAL MEDICINE

## 2021-10-31 ENCOUNTER — HOSPITAL ENCOUNTER (EMERGENCY)
Facility: HOSPITAL | Age: 31
Discharge: HOME | End: 2021-10-31
Attending: EMERGENCY MEDICINE | Admitting: EMERGENCY MEDICINE

## 2021-10-31 ENCOUNTER — APPOINTMENT (OUTPATIENT)
Dept: CT IMAGING | Facility: HOSPITAL | Age: 31
End: 2021-10-31

## 2021-10-31 VITALS
TEMPERATURE: 98.9 F | DIASTOLIC BLOOD PRESSURE: 69 MMHG | SYSTOLIC BLOOD PRESSURE: 116 MMHG | WEIGHT: 163 LBS | RESPIRATION RATE: 16 BRPM | OXYGEN SATURATION: 99 % | HEIGHT: 70 IN | HEART RATE: 79 BPM | BODY MASS INDEX: 23.34 KG/M2

## 2021-10-31 DIAGNOSIS — R56.9 SEIZURE (HCC): Primary | ICD-10-CM

## 2021-10-31 LAB
ALBUMIN SERPL-MCNC: 4.7 G/DL (ref 3.5–5.2)
ALBUMIN/GLOB SERPL: 2 G/DL
ALP SERPL-CCNC: 86 U/L (ref 39–117)
ALT SERPL W P-5'-P-CCNC: 18 U/L (ref 1–41)
ANION GAP SERPL CALCULATED.3IONS-SCNC: 11 MMOL/L (ref 5–15)
AST SERPL-CCNC: 25 U/L (ref 1–40)
BASOPHILS # BLD AUTO: 0.03 10*3/MM3 (ref 0–0.2)
BASOPHILS NFR BLD AUTO: 0.3 % (ref 0–1.5)
BILIRUB SERPL-MCNC: 0.4 MG/DL (ref 0–1.2)
BILIRUB UR QL STRIP: NEGATIVE
BUN SERPL-MCNC: 11 MG/DL (ref 6–20)
BUN/CREAT SERPL: 10.4 (ref 7–25)
CALCIUM SPEC-SCNC: 9.3 MG/DL (ref 8.6–10.5)
CHLORIDE SERPL-SCNC: 103 MMOL/L (ref 98–107)
CLARITY UR: CLEAR
CO2 SERPL-SCNC: 25 MMOL/L (ref 22–29)
COLOR UR: YELLOW
CREAT SERPL-MCNC: 1.06 MG/DL (ref 0.76–1.27)
DEPRECATED RDW RBC AUTO: 36.2 FL (ref 37–54)
EOSINOPHIL # BLD AUTO: 0.02 10*3/MM3 (ref 0–0.4)
EOSINOPHIL NFR BLD AUTO: 0.2 % (ref 0.3–6.2)
ERYTHROCYTE [DISTWIDTH] IN BLOOD BY AUTOMATED COUNT: 11.9 % (ref 12.3–15.4)
GFR SERPL CREATININE-BSD FRML MDRD: 81 ML/MIN/1.73
GLOBULIN UR ELPH-MCNC: 2.3 GM/DL
GLUCOSE SERPL-MCNC: 99 MG/DL (ref 65–99)
GLUCOSE UR STRIP-MCNC: NEGATIVE MG/DL
HCT VFR BLD AUTO: 39 % (ref 37.5–51)
HGB BLD-MCNC: 13.8 G/DL (ref 13–17.7)
HGB UR QL STRIP.AUTO: NEGATIVE
IMM GRANULOCYTES # BLD AUTO: 0.03 10*3/MM3 (ref 0–0.05)
IMM GRANULOCYTES NFR BLD AUTO: 0.3 % (ref 0–0.5)
KETONES UR QL STRIP: ABNORMAL
LEUKOCYTE ESTERASE UR QL STRIP.AUTO: NEGATIVE
LYMPHOCYTES # BLD AUTO: 1.07 10*3/MM3 (ref 0.7–3.1)
LYMPHOCYTES NFR BLD AUTO: 11.3 % (ref 19.6–45.3)
MCH RBC QN AUTO: 29.9 PG (ref 26.6–33)
MCHC RBC AUTO-ENTMCNC: 35.4 G/DL (ref 31.5–35.7)
MCV RBC AUTO: 84.6 FL (ref 79–97)
MONOCYTES # BLD AUTO: 0.68 10*3/MM3 (ref 0.1–0.9)
MONOCYTES NFR BLD AUTO: 7.2 % (ref 5–12)
NEUTROPHILS NFR BLD AUTO: 7.61 10*3/MM3 (ref 1.7–7)
NEUTROPHILS NFR BLD AUTO: 80.7 % (ref 42.7–76)
NITRITE UR QL STRIP: NEGATIVE
NRBC BLD AUTO-RTO: 0 /100 WBC (ref 0–0.2)
PH UR STRIP.AUTO: <=5 [PH] (ref 5–8)
PLATELET # BLD AUTO: 142 10*3/MM3 (ref 140–450)
PMV BLD AUTO: 11.9 FL (ref 6–12)
POTASSIUM SERPL-SCNC: 3.9 MMOL/L (ref 3.5–5.2)
PROT SERPL-MCNC: 7 G/DL (ref 6–8.5)
PROT UR QL STRIP: NEGATIVE
RBC # BLD AUTO: 4.61 10*6/MM3 (ref 4.14–5.8)
SODIUM SERPL-SCNC: 139 MMOL/L (ref 136–145)
SP GR UR STRIP: 1.01 (ref 1–1.03)
UROBILINOGEN UR QL STRIP: ABNORMAL
WBC # BLD AUTO: 9.44 10*3/MM3 (ref 3.4–10.8)

## 2021-10-31 PROCEDURE — 99284 EMERGENCY DEPT VISIT MOD MDM: CPT

## 2021-10-31 PROCEDURE — 0 LEVETIRACETAM IN NACL 0.75% 1000 MG/100ML SOLUTION: Performed by: PHYSICIAN ASSISTANT

## 2021-10-31 PROCEDURE — 80053 COMPREHEN METABOLIC PANEL: CPT | Performed by: PHYSICIAN ASSISTANT

## 2021-10-31 PROCEDURE — 81003 URINALYSIS AUTO W/O SCOPE: CPT | Performed by: PHYSICIAN ASSISTANT

## 2021-10-31 PROCEDURE — 96365 THER/PROPH/DIAG IV INF INIT: CPT

## 2021-10-31 PROCEDURE — 70450 CT HEAD/BRAIN W/O DYE: CPT

## 2021-10-31 PROCEDURE — 85025 COMPLETE CBC W/AUTO DIFF WBC: CPT | Performed by: PHYSICIAN ASSISTANT

## 2021-10-31 RX ORDER — LEVETIRACETAM 10 MG/ML
1000 INJECTION INTRAVASCULAR ONCE
Status: COMPLETED | OUTPATIENT
Start: 2021-10-31 | End: 2021-10-31

## 2021-10-31 RX ADMIN — SODIUM CHLORIDE 1000 ML: 9 INJECTION, SOLUTION INTRAVENOUS at 20:40

## 2021-10-31 RX ADMIN — LEVETIRACETAM 1000 MG: 10 INJECTION INTRAVASCULAR at 20:52

## 2021-11-01 NOTE — ED PROVIDER NOTES
"Subjective   Patient is a 31-year-old male who presents to the emergency room this evening following seizure activity.  Patient reports he was eating approximately 1 hour prior to arrival with his girlfriend when he experienced a seizure.Patient states that his girlfriend became scared and screamed and patient fell to the floor and experienced a seizure that lasted approximately 2 to 3 minutes.  Patient has come to since the seizure and is post ictal upon interview and exam.  Able to answer questions appropriately.  Patient has past medical history significant for seizure activities with his last seizure in February 2017. Patient follows with Dr. Moran with neurology and takes Keppra 500 mg twice daily.  Patient shares that all of his seizures in the past have been in the presence of alcohol.  He states that he went to a [employer] party last night where he became intoxicated.  He states that today he was hung over and recovering when he experienced his seizure activity.          Review of Systems   Constitutional: Positive for activity change.   Neurological: Positive for seizures.   All other systems reviewed and are negative.      Past Medical History:   Diagnosis Date   • Seizures (HCC)    • Seizures (HCC) 2013       No Known Allergies    Past Surgical History:   Procedure Laterality Date   • WISDOM TOOTH EXTRACTION         Family History   Problem Relation Age of Onset   • Dementia Maternal Grandmother    • Dementia Maternal Grandfather        Social History     Socioeconomic History   • Marital status: Single   Tobacco Use   • Smoking status: Never Smoker   • Smokeless tobacco: Never Used   Substance and Sexual Activity   • Alcohol use: Yes     Comment: 1-2 a week or less   • Drug use: Yes     Types: Marijuana     Comment: \"NOT RECENTLY\"           Objective   Physical Exam  Vitals and nursing note reviewed.   Constitutional:       General: He is not in acute distress.     Appearance: Normal appearance. He is " not ill-appearing or toxic-appearing.   HENT:      Head: Normocephalic and atraumatic.      Nose: Nose normal.      Mouth/Throat:      Mouth: Mucous membranes are moist.   Eyes:      Extraocular Movements: Extraocular movements intact.      Conjunctiva/sclera: Conjunctivae normal.   Cardiovascular:      Rate and Rhythm: Normal rate and regular rhythm.      Pulses: Normal pulses.      Heart sounds: Normal heart sounds.   Pulmonary:      Effort: Pulmonary effort is normal. No respiratory distress.   Abdominal:      Palpations: Abdomen is soft.      Tenderness: There is no abdominal tenderness.   Musculoskeletal:         General: Normal range of motion.      Cervical back: Normal range of motion.   Skin:     General: Skin is warm and dry.   Neurological:      General: No focal deficit present.      Mental Status: He is alert.   Psychiatric:         Mood and Affect: Mood normal.         Behavior: Behavior normal.         Thought Content: Thought content normal.         Judgment: Judgment normal.         Procedures           ED Course  ED Course as of 11/03/21 1419   Wed Nov 03, 2021   1416 Patient is a 31-year-old male who presents to the emergency room following a witnessed seizure.  Patient has known seizure disorder related to alcohol consumption and is medicated with Keppra 500 mg twice daily.  Patient shares that he was at a [employer] party last night where he over consumed alcohol.  He shares that he was very hung over today but was feeling somewhat better and getting ready to eat breakfast when he experienced a seizure with his girlfriend as a bystander.  No acute or emergent findings demonstrated on physical exam.  Labs without acute or emergent abnormalities.  CT the head demonstrates no acute intracranial abnormalities.  Patient received IV fluids and loading dose of Keppra while in the ED.  Patient is afebrile, nontoxic appearing, vital signs stable and able to maintain O2 sats of 99% on room air. Patient  "will be discharged home with outpatient follow up to their primary care provider and neurologist as recommended. Patient is agreeable to plan of care of outpatient follow up, provided clear return precautions and demonstrated understanding. [JG]      ED Course User Index  [JG] John Freitas, PA      No results found for this or any previous visit (from the past 24 hour(s)).  Note: In addition to lab results from this visit, the labs listed above may include labs taken at another facility or during a different encounter within the last 24 hours. Please correlate lab times with ED admission and discharge times for further clarification of the services performed during this visit.    CT Head Without Contrast   Final Result      1.  No acute intracranial abnormality.      Signer Name: DAKOTA GARCIA MD    Signed: 10/31/2021 8:29 PM    Workstation Name: [id number]     [facility]        Vitals:    10/31/21 1945 10/31/21 1948 10/31/21 2117   BP:  130/87 116/69   BP Location:  Right arm Right arm   Patient Position:  Sitting Sitting   Pulse:  102 79   Resp: 16 16 16   Temp:  98.9 °F (37.2 °C)    TempSrc:  Oral    SpO2:  95% 99%   Weight: 73.9 kg (163 lb)     Height: 177.8 cm (70\")       Medications   sodium chloride 0.9 % bolus 1,000 mL (0 mL Intravenous Stopped 10/31/21 2117)   levETIRAcetam in NaCl 0.75% (KEPPRA) IVPB 1,000 mg (0 mg Intravenous Stopped 10/31/21 2117)     ECG/EMG Results (last 24 hours)     ** No results found for the last 24 hours. **        No orders to display                                          MDM    Final diagnoses:   Seizure (HCC)       ED Disposition  ED Disposition     ED Disposition Condition Comment    Discharge Stable           Shahab Chambers MD  [address] [place] [address] [phone]    Call   As needed    Tracie Moran MD  [address]  [facility] [address] [phone]    Schedule an appointment as soon as possible for a " visit   Call for follow-up with neurology    [facility] Emergency Department  [address] [address] [phone]  Go to   If symptoms worsen         Medication List      No changes were made to your prescriptions during this visit.          John Freitas, PA  11/03/21 1410

## 2021-11-05 ENCOUNTER — OFFICE VISIT (OUTPATIENT)
Dept: NEUROLOGY | Facility: CLINIC | Age: 31
End: 2021-11-05

## 2021-11-05 VITALS
WEIGHT: 164 LBS | HEIGHT: 70 IN | BODY MASS INDEX: 23.48 KG/M2 | OXYGEN SATURATION: 98 % | DIASTOLIC BLOOD PRESSURE: 70 MMHG | SYSTOLIC BLOOD PRESSURE: 126 MMHG | HEART RATE: 82 BPM

## 2021-11-05 DIAGNOSIS — G40.309 GENERALIZED EPILEPSY (HCC): Primary | ICD-10-CM

## 2021-11-05 PROCEDURE — 99214 OFFICE O/P EST MOD 30 MIN: CPT | Performed by: PSYCHIATRY & NEUROLOGY

## 2021-11-05 RX ORDER — CHLORAL HYDRATE 500 MG
CAPSULE ORAL
COMMUNITY

## 2021-11-05 RX ORDER — LEVETIRACETAM 750 MG/1
750 TABLET ORAL 2 TIMES DAILY
Qty: 60 TABLET | Refills: 11 | Status: SHIPPED | OUTPATIENT
Start: 2021-11-05 | End: 2022-11-14

## 2021-11-05 NOTE — PROGRESS NOTES
Subjective:    CC: Richie Alvares is seen today for Generalized epilepsy       HPI:  Current visit-patient had one seizure on Halloween day with generalized tonic-clonic shaking.  Patient states he had been drinking heavily the day before as he went to a Halloween party.  He also feels that he may have missed his dose of Keppra that night.  The next day he was hung over and had thrown up twice.  Then in the evening when he sat down to eat food he seemed confused as per his girlfriend who is with him today.  After that he let out a scream and stiffened up followed by whole body shaking.  The seizure lasted for slightly over a minute but he was confused for about 30 minutes afterwards.  He went to the ER where he had a CT scan of the head that was unremarkable and blood work including urine analysis that was normal.  He continues to take Keppra 500 mg twice a day.  Last seizure prior to that was in 2017.    Last visit-patient states he has not had any seizures since his last visit.  In fact his last grand mal seizure was in 2017.  He continues to take Keppra 500 mg twice a day.  He may take an extra dose if he feels jittery in the morning.  He moved into a new house 2 months ago and was stressed out as a result of that but did not feel any episodes come on.  Also denies having any episodes with staring or body jerks.  He does refrain from binge drinking now.    Last visit-this is a patient previously seen by Dr. Meng for seizures.  Patient had his first seizure in 2013.  With that episode he was under a lot of stress and was also sleep deprived.  Had been drinking 3 to 4 glasses of beer the day before.  Prior to the episode he felt slightly lightheaded and had trouble processing things.  After that he had whole body shaking.  Denies having any tongue bite or incontinence.  He was initially started on Dilantin and subsequently switched to Lamictal but reduced his dose to once a day as he was having some  difficulty focusing.  Had his second seizure in 2017 in the early morning.  At that point he was completely off of Lamictal as he had been seizure-free.  After that Dr. Meng restarted lamotrigine however patient started having side effects hence she switched him to Keppra 500 mg twice a day that he has tolerated well.  Has not had any more seizures since 2017.  Does report to getting adequate sleep now.  Also avoids drinking alcohol but sometimes does binge drinking for example this last Halloween he must have had 5-6 beers.  He had an MRI brain in the past at  that was normal and an EEG also done in 2013 that showed generalized spike and wave discharges and bursts.  Denies having any myoclonic jerks or episodes of staring.  There is no family history of seizures.  He also denies any febrile seizures as a child however did have a high fever at age 4.  Of note- I reviewed Dr. Meng's notes as follows-    Pt with first seizure 3/13. First seen by Dr. Ervin at . Brain MRI reviewed disc from 3/13: normal. EEG at [facility]/  3/13 showed generalized spike and wave discharges in bursts.   The seizure occurred in setting of recent increased stress and sleep deprivation. Immediately before recalls having a little trouble processing. Had same feeling few times in past, when he was dehydrated and nearly passed out after running. Day prior to the seizure he had drunk about 24 ounces of beer which was not unusual for him. No TB or incontinence.  Born full term, no complications of pregnancy or delivery. When about [age], had very high fever, but no convulsion. No TBI with LOC. No CNS infection. No known family history of seizure, but father's side slightly less well known.  Started Dilantin, then planned to start LTG. no morning myoclonus.  At visit 10/8/13 was on LTG XR 400mg and tolerating well. Blood level 10/7/13 was 10. Very expensive. Switched to Lamictal 200 mg bid due to cost.  9/14: Good compliance --  misses no more than once/month. No seizure or prodrome.   7/15: No seizures. Felt like the LTG at higher dose was keeping mind running, and so cut back dose to 200mg, after forgetting dose couple days then taking dose and noticing SEs. Takes this immediate release tab in the morning. Seizures have occurred in the very early morning hours. Does not want to go off medicine at this time but prefers this lower dose.     2/17: was seizure free for approximately four years, discontinued Lamictal around one year ago with no issue. On 2/10, he was seen at [facility] ED following a recurrent seizure. He notes that he had difficulty focusing and felt LH right before. Co-workers reported right arm stiffening and convulsions, duration unclear. Had gone out with friends drinking the night before. Restarted Lamictal at the ED and titrating up to 100mg BID.  10/17: phoned in March, mood swings on LTG, planned change to LVT. Doing well with LVT 500mg bid. No seizures but as in past, couple times after running and dehydrated felt hard to think, but not like before seizure. Denies any significant mood or other SEs.   Today: doing well. Exercising regularly. No problems with LVT 500mg bid.  No sx of seizure at all. Sometimes sleep deprived, sometimes drinks alcohol, no near seizures. Now sees Dr Chambers at .     The following portions of the patient's history were reviewed today and updated as of 11/11/2019  : allergies, current medications, past family history, past medical history, past social history, past surgical history and problem list  These document will be scanned to patient's chart.      Current Outpatient Medications:   •  Inulin (FIBER CHOICE PO), Fiber (dextrin) 3 gram/3.5 gram oral powder  Take by oral route., Disp: , Rfl:   •  multivitamin with minerals tablet tablet, Take 1 tablet by mouth Daily., Disp: , Rfl:   •  Omega-3 Fatty Acids (fish oil) 1000 MG capsule capsule, Take  by mouth Daily With Breakfast., Disp: , Rfl:   •   "levETIRAcetam (KEPPRA) 750 MG tablet, Take 1 tablet by mouth 2 (Two) Times a Day., Disp: 60 tablet, Rfl: 11   Past Medical History:   Diagnosis Date   • Seizures (HCC)    • Seizures (HCC) 2013      Past Surgical History:   Procedure Laterality Date   • WISDOM TOOTH EXTRACTION        Family History   Problem Relation Age of Onset   • Dementia Maternal Grandmother    • Dementia Maternal Grandfather       Social History     Socioeconomic History   • Marital status: Single   Tobacco Use   • Smoking status: Never Smoker   • Smokeless tobacco: Never Used   Vaping Use   • Vaping Use: Never used   Substance and Sexual Activity   • Alcohol use: Yes     Comment: 1-2 a week or less   • Drug use: Yes     Types: Marijuana     Comment: \"NOT RECENTLY\"   • Sexual activity: Defer     Review of Systems   Neurological: Positive for seizures.   All other systems reviewed and are negative.      Objective:    /70   Pulse 82   Ht 177.8 cm (70\")   Wt 74.4 kg (164 lb)   SpO2 98%   BMI 23.53 kg/m²     Neurology Exam:    General apperance: NAD.     Mental status: Alert, awake and oriented to time place and person.    Recent and Remote memory: Intact.    Attention span and Concentration: Normal.     Language and Speech: Intact- No dysarthria.    Fluency, Naming , Repitition and Comprehension:  Intact    Cranial Nerves:   CN II: Visual fields are full. Intact. Fundi - Normal, No papillederma, Pupils - TEAGAN  CN III, IV and VI: Extraocular movements are intact. Normal saccades.   CN V: Facial sensation is intact.   CN VII: Muscles of facial expression reveal no asymmetry. Intact.   CN VIII: Hearing is intact. Whispered voice intact.   CN IX and X: Palate elevates symmetrically. Intact  CN XI: Shoulder shrug is intact.   CN XII: Tongue is midline without evidence of atrophy or fasciculation.     Ophthalmoscopic exam of optic disc-normal    Motor:  Right UE muscle strength 5/5. Normal tone.     Left UE muscle strength 5/5. Normal tone. "      Right LE muscle strength5/5. Normal tone.     Left LE muscle strength 5/5. Normal tone.      Sensory: Normal light touch, vibration and pinprick sensation bilaterally.    DTRs: 2+ bilaterally in upper and lower extremities.    Babinski: Negative bilaterally.    Co-ordination: Normal finger-to-nose, heel to shin B/L.    Rhomberg: Negative.    Gait: Normal.    Cardiovascular: Regular rate and rhythm without murmur, gallop or rub.    Assessment and Plan:  1. Cryptogenic generalized epilepsy (CMS/HCC)  Patient has generalized epilepsy.  In the past EEG has shown generalized spike-wave discharges.  MRI brain was normal  I will increase his Keppra to 750 mg twice a day  I have counseled him to avoid binge drinking as all of his past seizures have been in the setting of heavy drinking the day before.  If he throws up profusely he can take an extra dose of Keppra and keep himself well-hydrated  I have also counseled him on good sleep hygiene and seizure precautions.  I explained to his girlfriend the measures she could take if he had another seizure.       Return in about 6 months (around 5/5/2022).         Tracie Moran MD

## 2021-12-28 PROCEDURE — U0004 COV-19 TEST NON-CDC HGH THRU: HCPCS | Performed by: PHYSICIAN ASSISTANT

## 2022-05-10 ENCOUNTER — OFFICE VISIT (OUTPATIENT)
Dept: NEUROLOGY | Facility: CLINIC | Age: 32
End: 2022-05-10

## 2022-05-10 VITALS
HEART RATE: 69 BPM | DIASTOLIC BLOOD PRESSURE: 72 MMHG | HEIGHT: 70 IN | OXYGEN SATURATION: 98 % | SYSTOLIC BLOOD PRESSURE: 118 MMHG | WEIGHT: 163 LBS | BODY MASS INDEX: 23.34 KG/M2

## 2022-05-10 DIAGNOSIS — G40.309 GENERALIZED EPILEPSY: Primary | ICD-10-CM

## 2022-05-10 PROCEDURE — 99213 OFFICE O/P EST LOW 20 MIN: CPT | Performed by: PSYCHIATRY & NEUROLOGY

## 2022-05-10 NOTE — PROGRESS NOTES
Subjective:    CC: Richie Alvares is seen today for Seizures       HPI:  Current visit- patient increased his dose of Keppra to 750 mg twice a day and is tolerating it well.  He denies having any seizures since his last visit.  In fact his last seizure was on Halloween 2021.  Prior to that it was in 2017.  He has quit drinking alcohol altogether since his last seizure.  Also tries to get adequate sleep at night.      Last visit-patient had one seizure on Halloween day with generalized tonic-clonic shaking.  Patient states he had been drinking heavily the day before as he went to a [employer] party.  He also feels that he may have missed his dose of Keppra that night.  The next day he was hung over and had thrown up twice.  Then in the evening when he sat down to eat food he seemed confused as per his girlfriend who is with him today.  After that he let out a scream and stiffened up followed by whole body shaking.  The seizure lasted for slightly over a minute but he was confused for about 30 minutes afterwards.  He went to the ER where he had a CT scan of the head that was unremarkable and blood work including urine analysis that was normal.  He continues to take Keppra 500 mg twice a day.  Last seizure prior to that was in 2017.    Last visit-patient states he has not had any seizures since his last visit.  In fact his last grand mal seizure was in 2017.  He continues to take Keppra 500 mg twice a day.  He may take an extra dose if he feels jittery in the morning.  He moved into a new house 2 months ago and was stressed out as a result of that but did not feel any episodes come on.  Also denies having any episodes with staring or body jerks.  He does refrain from binge drinking now.    Last visit-this is a patient previously seen by Dr. Meng for seizures.  Patient had his first seizure in 2013.  With that episode he was under a lot of stress and was also sleep deprived.  Had been drinking 3 to 4 glasses of  beer the day before.  Prior to the episode he felt slightly lightheaded and had trouble processing things.  After that he had whole body shaking.  Denies having any tongue bite or incontinence.  He was initially started on Dilantin and subsequently switched to Lamictal but reduced his dose to once a day as he was having some difficulty focusing.  Had his second seizure in 2017 in the early morning.  At that point he was completely off of Lamictal as he had been seizure-free.  After that Dr. Meng restarted lamotrigine however patient started having side effects hence she switched him to Keppra 500 mg twice a day that he has tolerated well.  Has not had any more seizures since 2017.  Does report to getting adequate sleep now.  Also avoids drinking alcohol but sometimes does binge drinking for example this last Halloween he must have had 5-6 beers.  He had an MRI brain in the past at  that was normal and an EEG also done in 2013 that showed generalized spike and wave discharges and bursts.  Denies having any myoclonic jerks or episodes of staring.  There is no family history of seizures.  He also denies any febrile seizures as a child however did have a high fever at age 4.  Of note- I reviewed Dr. Meng's notes as follows-    Pt with first seizure 3/13. First seen by Dr. Ervin at . Brain MRI reviewed disc from 3/13: normal. EEG at [facility]/  3/13 showed generalized spike and wave discharges in bursts.   The seizure occurred in setting of recent increased stress and sleep deprivation. Immediately before recalls having a little trouble processing. Had same feeling few times in past, when he was dehydrated and nearly passed out after running. Day prior to the seizure he had drunk about 24 ounces of beer which was not unusual for him. No TB or incontinence.  Born full term, no complications of pregnancy or delivery. When about [age], had very high fever, but no convulsion. No TBI with LOC. No CNS  infection. No known family history of seizure, but father's side slightly less well known.  Started Dilantin, then planned to start LTG. no morning myoclonus.  At visit 10/8/13 was on LTG XR 400mg and tolerating well. Blood level 10/7/13 was 10. Very expensive. Switched to Lamictal 200 mg bid due to cost.  9/14: Good compliance -- misses no more than once/month. No seizure or prodrome.   7/15: No seizures. Felt like the LTG at higher dose was keeping mind running, and so cut back dose to 200mg, after forgetting dose couple days then taking dose and noticing SEs. Takes this immediate release tab in the morning. Seizures have occurred in the very early morning hours. Does not want to go off medicine at this time but prefers this lower dose.     2/17: was seizure free for approximately four years, discontinued Lamictal around one year ago with no issue. On 2/10, he was seen at [facility] ED following a recurrent seizure. He notes that he had difficulty focusing and felt LH right before. Co-workers reported right arm stiffening and convulsions, duration unclear. Had gone out with friends drinking the night before. Restarted Lamictal at the ED and titrating up to 100mg BID.  10/17: phoned in March, mood swings on LTG, planned change to LVT. Doing well with LVT 500mg bid. No seizures but as in past, couple times after running and dehydrated felt hard to think, but not like before seizure. Denies any significant mood or other SEs.   Today: doing well. Exercising regularly. No problems with LVT 500mg bid.  No sx of seizure at all. Sometimes sleep deprived, sometimes drinks alcohol, no near seizures. Now sees Dr Chambers at .     The following portions of the patient's history were reviewed today and updated as of 11/11/2019  : allergies, current medications, past family history, past medical history, past social history, past surgical history and problem list  These document will be scanned to patient's chart.      Current  "Outpatient Medications:   •  Inulin (FIBER CHOICE PO), Fiber (dextrin) 3 gram/3.5 gram oral powder  Take by oral route., Disp: , Rfl:   •  levETIRAcetam (KEPPRA) 750 MG tablet, Take 1 tablet by mouth 2 (Two) Times a Day., Disp: 60 tablet, Rfl: 11  •  multivitamin with minerals tablet tablet, Take 1 tablet by mouth Daily., Disp: , Rfl:   •  Omega-3 Fatty Acids (fish oil) 1000 MG capsule capsule, Take  by mouth Daily With Breakfast., Disp: , Rfl:    Past Medical History:   Diagnosis Date   • Seizures (HCC)    • Seizures (HCC) 2013      Past Surgical History:   Procedure Laterality Date   • WISDOM TOOTH EXTRACTION        Family History   Problem Relation Age of Onset   • Dementia Maternal Grandmother    • Dementia Maternal Grandfather       Social History     Socioeconomic History   • Marital status: Single   Tobacco Use   • Smoking status: Never Smoker   • Smokeless tobacco: Never Used   Vaping Use   • Vaping Use: Never used   Substance and Sexual Activity   • Alcohol use: Not Currently     Comment: 1-2 a week or less   • Drug use: Yes     Types: Marijuana     Comment: \"NOT RECENTLY\"   • Sexual activity: Yes     Partners: Female     Birth control/protection: Condom     Review of Systems   All other systems reviewed and are negative.      Objective:    /72   Pulse 69   Ht 177.8 cm (70\")   Wt 73.9 kg (163 lb)   SpO2 98%   BMI 23.39 kg/m²     Neurology Exam:    General apperance: NAD.     Mental status: Alert, awake and oriented to time place and person.    Recent and Remote memory: Intact.    Attention span and Concentration: Normal.     Language and Speech: Intact- No dysarthria.    Fluency, Naming , Repitition and Comprehension:  Intact    Cranial Nerves:   CN II: Visual fields are full. Intact. Fundi - Normal, No papillederma, Pupils - TEAGAN  CN III, IV and VI: Extraocular movements are intact. Normal saccades.   CN V: Facial sensation is intact.   CN VII: Muscles of facial expression reveal no asymmetry. " Intact.   CN VIII: Hearing is intact. Whispered voice intact.   CN IX and X: Palate elevates symmetrically. Intact  CN XI: Shoulder shrug is intact.   CN XII: Tongue is midline without evidence of atrophy or fasciculation.     Ophthalmoscopic exam of optic disc-normal    Motor:  Right UE muscle strength 5/5. Normal tone.     Left UE muscle strength 5/5. Normal tone.      Right LE muscle strength5/5. Normal tone.     Left LE muscle strength 5/5. Normal tone.      Sensory: Normal light touch, vibration and pinprick sensation bilaterally.    DTRs: 2+ bilaterally in upper and lower extremities.    Babinski: Negative bilaterally.    Co-ordination: Normal finger-to-nose, heel to shin B/L.    Rhomberg: Negative.    Gait: Normal.    Cardiovascular: Regular rate and rhythm without murmur, gallop or rub.    Assessment and Plan:  1. Cryptogenic generalized epilepsy (CMS/HCC)  Patient has generalized epilepsy.  In the past EEG has shown generalized spike-wave discharges.  MRI brain was normal  I have told him to continue Keppra  750 mg twice a day  I have once again counseled him to avoid binge drinking as all of his past seizures have been in the setting of heavy drinking the day before.  If he throws up profusely he can take an extra dose of Keppra and keep himself well-hydrated  I have also counseled him on good sleep hygiene and seizure precautions.       Return in about 1 year (around 5/10/2023).         Tracie Moran MD

## 2022-11-14 RX ORDER — LEVETIRACETAM 750 MG/1
TABLET ORAL
Qty: 180 TABLET | Refills: 3 | Status: SHIPPED | OUTPATIENT
Start: 2022-11-14

## 2023-05-10 ENCOUNTER — OFFICE VISIT (OUTPATIENT)
Dept: NEUROLOGY | Facility: CLINIC | Age: 33
End: 2023-05-10
Payer: COMMERCIAL

## 2023-05-10 VITALS
HEIGHT: 70 IN | OXYGEN SATURATION: 98 % | HEART RATE: 64 BPM | SYSTOLIC BLOOD PRESSURE: 116 MMHG | DIASTOLIC BLOOD PRESSURE: 82 MMHG | WEIGHT: 162.8 LBS | BODY MASS INDEX: 23.31 KG/M2

## 2023-05-10 DIAGNOSIS — G40.309 GENERALIZED EPILEPSY: Primary | ICD-10-CM

## 2023-05-10 PROCEDURE — 99213 OFFICE O/P EST LOW 20 MIN: CPT | Performed by: PSYCHIATRY & NEUROLOGY

## 2023-05-10 RX ORDER — LEVETIRACETAM 750 MG/1
750 TABLET ORAL 2 TIMES DAILY
Qty: 180 TABLET | Refills: 3 | Status: SHIPPED | OUTPATIENT
Start: 2023-05-10

## 2023-05-10 NOTE — PROGRESS NOTES
Subjective:    CC: Richie Alvares is seen today for Seizures       HPI:  Current visit- patient denies having any seizures in the past year with either confusion or shaking.  His last seizure was in October 2021.  In fact he stopped drinking after that seizure and has not restarted it again.  He continues to take Keppra 750 mg twice daily.    Last visit-patient increased his dose of Keppra to 750 mg twice a day and is tolerating it well.  He denies having any seizures since his last visit.  In fact his last seizure was on Halloween 2021.  Prior to that it was in 2017.  He has quit drinking alcohol altogether since his last seizure.  Also tries to get adequate sleep at night.      Last visit-patient had one seizure on Halloween day with generalized tonic-clonic shaking.  Patient states he had been drinking heavily the day before as he went to a [employer] party.  He also feels that he may have missed his dose of Keppra that night.  The next day he was hung over and had thrown up twice.  Then in the evening when he sat down to eat food he seemed confused as per his girlfriend who is with him today.  After that he let out a scream and stiffened up followed by whole body shaking.  The seizure lasted for slightly over a minute but he was confused for about 30 minutes afterwards.  He went to the ER where he had a CT scan of the head that was unremarkable and blood work including urine analysis that was normal.  He continues to take Keppra 500 mg twice a day.  Last seizure prior to that was in 2017.    Last visit-patient states he has not had any seizures since his last visit.  In fact his last grand mal seizure was in 2017.  He continues to take Keppra 500 mg twice a day.  He may take an extra dose if he feels jittery in the morning.  He moved into a new house 2 months ago and was stressed out as a result of that but did not feel any episodes come on.  Also denies having any episodes with staring or body jerks.  He does  refrain from binge drinking now.    Last visit-this is a patient previously seen by Dr. Meng for seizures.  Patient had his first seizure in 2013.  With that episode he was under a lot of stress and was also sleep deprived.  Had been drinking 3 to 4 glasses of beer the day before.  Prior to the episode he felt slightly lightheaded and had trouble processing things.  After that he had whole body shaking.  Denies having any tongue bite or incontinence.  He was initially started on Dilantin and subsequently switched to Lamictal but reduced his dose to once a day as he was having some difficulty focusing.  Had his second seizure in 2017 in the early morning.  At that point he was completely off of Lamictal as he had been seizure-free.  After that Dr. Meng restarted lamotrigine however patient started having side effects hence she switched him to Keppra 500 mg twice a day that he has tolerated well.  Has not had any more seizures since 2017.  Does report to getting adequate sleep now.  Also avoids drinking alcohol but sometimes does binge drinking for example this last Halloween he must have had 5-6 beers.  He had an MRI brain in the past at  that was normal and an EEG also done in 2013 that showed generalized spike and wave discharges and bursts.  Denies having any myoclonic jerks or episodes of staring.  There is no family history of seizures.  He also denies any febrile seizures as a child however did have a high fever at age 4.  Of note- I reviewed Dr. Meng's notes as follows-    Pt with first seizure 3/13. First seen by Dr. Ervin at . Brain MRI reviewed disc from 3/13: normal. EEG at [facility]/  3/13 showed generalized spike and wave discharges in bursts.   The seizure occurred in setting of recent increased stress and sleep deprivation. Immediately before recalls having a little trouble processing. Had same feeling few times in past, when he was dehydrated and nearly passed out after running.  Day prior to the seizure he had drunk about 24 ounces of beer which was not unusual for him. No TB or incontinence.  Born full term, no complications of pregnancy or delivery. When about [age], had very high fever, but no convulsion. No TBI with LOC. No CNS infection. No known family history of seizure, but father's side slightly less well known.  Started Dilantin, then planned to start LTG. no morning myoclonus.  At visit 10/8/13 was on LTG XR 400mg and tolerating well. Blood level 10/7/13 was 10. Very expensive. Switched to Lamictal 200 mg bid due to cost.  9/14: Good compliance -- misses no more than once/month. No seizure or prodrome.   7/15: No seizures. Felt like the LTG at higher dose was keeping mind running, and so cut back dose to 200mg, after forgetting dose couple days then taking dose and noticing SEs. Takes this immediate release tab in the morning. Seizures have occurred in the very early morning hours. Does not want to go off medicine at this time but prefers this lower dose.     2/17: was seizure free for approximately four years, discontinued Lamictal around one year ago with no issue. On 2/10, he was seen at [facility] ED following a recurrent seizure. He notes that he had difficulty focusing and felt LH right before. Co-workers reported right arm stiffening and convulsions, duration unclear. Had gone out with friends drinking the night before. Restarted Lamictal at the ED and titrating up to 100mg BID.  10/17: phoned in March, mood swings on LTG, planned change to LVT. Doing well with LVT 500mg bid. No seizures but as in past, couple times after running and dehydrated felt hard to think, but not like before seizure. Denies any significant mood or other SEs.   Today: doing well. Exercising regularly. No problems with LVT 500mg bid.  No sx of seizure at all. Sometimes sleep deprived, sometimes drinks alcohol, no near seizures. Now sees Dr Chambers at .     The following portions of the patient's history were  "reviewed today and updated as of 11/11/2019  : allergies, current medications, past family history, past medical history, past social history, past surgical history and problem list  These document will be scanned to patient's chart.      Current Outpatient Medications:   •  levETIRAcetam (KEPPRA) 750 MG tablet, Take 1 tablet by mouth 2 (Two) Times a Day., Disp: 180 tablet, Rfl: 3  •  Inulin (FIBER CHOICE PO), Fiber (dextrin) 3 gram/3.5 gram oral powder  Take by oral route., Disp: , Rfl:   •  multivitamin with minerals tablet tablet, Take 1 tablet by mouth Daily., Disp: , Rfl:   •  Omega-3 Fatty Acids (fish oil) 1000 MG capsule capsule, Take  by mouth Daily With Breakfast., Disp: , Rfl:    Past Medical History:   Diagnosis Date   • Seizures    • Seizures 2013      Past Surgical History:   Procedure Laterality Date   • WISDOM TOOTH EXTRACTION        Family History   Problem Relation Age of Onset   • Dementia Maternal Grandmother    • Dementia Maternal Grandfather       Social History     Socioeconomic History   • Marital status: Single   Tobacco Use   • Smoking status: Never     Passive exposure: Never   • Smokeless tobacco: Never   Vaping Use   • Vaping Use: Never used   Substance and Sexual Activity   • Alcohol use: Not Currently     Comment: 1-2 a week or less   • Drug use: Yes     Types: Marijuana     Comment: \"NOT RECENTLY\"   • Sexual activity: Yes     Partners: Female     Birth control/protection: Condom     Review of Systems   All other systems reviewed and are negative.      Objective:    /82   Pulse 64   Ht 177.8 cm (70\")   Wt 73.8 kg (162 lb 12.8 oz)   SpO2 98%   BMI 23.36 kg/m²     Neurology Exam:    General apperance: NAD.     Mental status: Alert, awake and oriented to time place and person.    Recent and Remote memory: Intact.    Attention span and Concentration: Normal.     Language and Speech: Intact- No dysarthria.    Fluency, Naming , Repitition and Comprehension:  Intact    Cranial " Nerves:   CN II: Visual fields are full. Intact. Fundi - Normal, No papillederma, Pupils - TEAGAN  CN III, IV and VI: Extraocular movements are intact. Normal saccades.   CN V: Facial sensation is intact.   CN VII: Muscles of facial expression reveal no asymmetry. Intact.   CN VIII: Hearing is intact. Whispered voice intact.   CN IX and X: Palate elevates symmetrically. Intact  CN XI: Shoulder shrug is intact.   CN XII: Tongue is midline without evidence of atrophy or fasciculation.     Ophthalmoscopic exam of optic disc-normal    Motor:  Right UE muscle strength 5/5. Normal tone.     Left UE muscle strength 5/5. Normal tone.      Right LE muscle strength5/5. Normal tone.     Left LE muscle strength 5/5. Normal tone.      Sensory: Normal light touch, vibration and pinprick sensation bilaterally.    DTRs: 2+ bilaterally in upper and lower extremities.    Babinski: Negative bilaterally.    Co-ordination: Normal finger-to-nose, heel to shin B/L.    Rhomberg: Negative.    Gait: Normal.    Cardiovascular: Regular rate and rhythm without murmur, gallop or rub.    Assessment and Plan:  1.  Idiopathic generalized epilepsy (CMS/HCC)  Patient has generalized epilepsy.  In the past EEG has shown generalized spike-wave discharges.  MRI brain was normal  I have told him to continue Keppra  750 mg twice a day  He has quit drinking alcohol altogether  I have also counseled him on good sleep hygiene and seizure precautions.       Return in about 1 year (around 5/10/2024).         Tracie Moran MD

## 2023-08-23 ENCOUNTER — TELEPHONE (OUTPATIENT)
Dept: NEUROLOGY | Facility: CLINIC | Age: 33
End: 2023-08-23
Payer: COMMERCIAL

## 2023-08-23 NOTE — TELEPHONE ENCOUNTER
LVM OFFERING AN EARLIER APPOINTMENT WITH DR. MONTES FOR HIS FOLLOW UP. CURRENTLY WAS LOOKING ON 8.28.23.

## 2023-08-24 NOTE — TELEPHONE ENCOUNTER
Richie called back. I did not see any availability left on the 28th so it may have been filled but he is scheduled next week on Thursday @ 11:45am and says he will see us then. Thanks!

## 2023-08-24 NOTE — TELEPHONE ENCOUNTER
LVM REQUESTING PATIENT COME IN AT 11:00 AM ON THE SAME DAY OF HIS APPOINTMENT 8.31.23 IF AT ALL POSSIBLE.

## 2023-08-31 ENCOUNTER — OFFICE VISIT (OUTPATIENT)
Dept: NEUROLOGY | Facility: CLINIC | Age: 33
End: 2023-08-31
Payer: COMMERCIAL

## 2023-08-31 VITALS
DIASTOLIC BLOOD PRESSURE: 76 MMHG | BODY MASS INDEX: 23.22 KG/M2 | HEIGHT: 70 IN | HEART RATE: 65 BPM | WEIGHT: 162.2 LBS | OXYGEN SATURATION: 98 % | SYSTOLIC BLOOD PRESSURE: 110 MMHG

## 2023-08-31 DIAGNOSIS — G40.309 GENERALIZED EPILEPSY: Primary | ICD-10-CM

## 2023-08-31 PROCEDURE — 99213 OFFICE O/P EST LOW 20 MIN: CPT | Performed by: PSYCHIATRY & NEUROLOGY

## 2023-08-31 RX ORDER — LEVETIRACETAM 1000 MG/1
1000 TABLET ORAL 2 TIMES DAILY
Qty: 180 TABLET | Refills: 3 | Status: SHIPPED | OUTPATIENT
Start: 2023-08-31

## 2023-08-31 NOTE — PROGRESS NOTES
Subjective:    CC: Richie Alvares is seen today for Seizures       HPI:  Current visit-patient had 1 seizure while sleeping on 7/15.  He was on the couch when his girlfriend saw him have whole body shaking.  He denies any tongue bite or bowel/bladder incontinence.  Also denies missing any doses of Keppra or being sick/sleep deprived.  He has not had alcohol since 2021.  He went to the ER Keppra was increased to 1000 mg twice daily.  Blood work revealed an elevated lactic acid but otherwise electrolytes and U tox was unremarkable.      Last visit-patient denies having any seizures in the past year with either confusion or shaking.  His last seizure was in October 2021.  In fact he stopped drinking after that seizure and has not restarted it again.  He continues to take Keppra 750 mg twice daily.    Last visit-patient increased his dose of Keppra to 750 mg twice a day and is tolerating it well.  He denies having any seizures since his last visit.  In fact his last seizure was on Halloween 2021.  Prior to that it was in 2017.  He has quit drinking alcohol altogether since his last seizure.  Also tries to get adequate sleep at night.      Last visit-patient had one seizure on Halloween day with generalized tonic-clonic shaking.  Patient states he had been drinking heavily the day before as he went to a [employer] party.  He also feels that he may have missed his dose of Keppra that night.  The next day he was hung over and had thrown up twice.  Then in the evening when he sat down to eat food he seemed confused as per his girlfriend who is with him today.  After that he let out a scream and stiffened up followed by whole body shaking.  The seizure lasted for slightly over a minute but he was confused for about 30 minutes afterwards.  He went to the ER where he had a CT scan of the head that was unremarkable and blood work including urine analysis that was normal.  He continues to take Keppra 500 mg twice a day.  Last  seizure prior to that was in 2017.    Last visit-patient states he has not had any seizures since his last visit.  In fact his last grand mal seizure was in 2017.  He continues to take Keppra 500 mg twice a day.  He may take an extra dose if he feels jittery in the morning.  He moved into a new house 2 months ago and was stressed out as a result of that but did not feel any episodes come on.  Also denies having any episodes with staring or body jerks.  He does refrain from binge drinking now.    Last visit-this is a patient previously seen by Dr. Meng for seizures.  Patient had his first seizure in 2013.  With that episode he was under a lot of stress and was also sleep deprived.  Had been drinking 3 to 4 glasses of beer the day before.  Prior to the episode he felt slightly lightheaded and had trouble processing things.  After that he had whole body shaking.  Denies having any tongue bite or incontinence.  He was initially started on Dilantin and subsequently switched to Lamictal but reduced his dose to once a day as he was having some difficulty focusing.  Had his second seizure in 2017 in the early morning.  At that point he was completely off of Lamictal as he had been seizure-free.  After that Dr. Meng restarted lamotrigine however patient started having side effects hence she switched him to Keppra 500 mg twice a day that he has tolerated well.  Has not had any more seizures since 2017.  Does report to getting adequate sleep now.  Also avoids drinking alcohol but sometimes does binge drinking for example this last Halloween he must have had 5-6 beers.  He had an MRI brain in the past at  that was normal and an EEG also done in 2013 that showed generalized spike and wave discharges and bursts.  Denies having any myoclonic jerks or episodes of staring.  There is no family history of seizures.  He also denies any febrile seizures as a child however did have a high fever at age 4.  Of note- I reviewed  Dr. Meng's notes as follows-    Pt with first seizure 3/13. First seen by Dr. Ervin at . Brain MRI reviewed disc from 3/13: normal. EEG at [facility]/  3/13 showed generalized spike and wave discharges in bursts.   The seizure occurred in setting of recent increased stress and sleep deprivation. Immediately before recalls having a little trouble processing. Had same feeling few times in past, when he was dehydrated and nearly passed out after running. Day prior to the seizure he had drunk about 24 ounces of beer which was not unusual for him. No TB or incontinence.  Born full term, no complications of pregnancy or delivery. When about [age], had very high fever, but no convulsion. No TBI with LOC. No CNS infection. No known family history of seizure, but father's side slightly less well known.  Started Dilantin, then planned to start LTG. no morning myoclonus.  At visit 10/8/13 was on LTG XR 400mg and tolerating well. Blood level 10/7/13 was 10. Very expensive. Switched to Lamictal 200 mg bid due to cost.  9/14: Good compliance -- misses no more than once/month. No seizure or prodrome.   7/15: No seizures. Felt like the LTG at higher dose was keeping mind running, and so cut back dose to 200mg, after forgetting dose couple days then taking dose and noticing SEs. Takes this immediate release tab in the morning. Seizures have occurred in the very early morning hours. Does not want to go off medicine at this time but prefers this lower dose.     2/17: was seizure free for approximately four years, discontinued Lamictal around one year ago with no issue. On 2/10, he was seen at [facility] ED following a recurrent seizure. He notes that he had difficulty focusing and felt LH right before. Co-workers reported right arm stiffening and convulsions, duration unclear. Had gone out with friends drinking the night before. Restarted Lamictal at the ED and titrating up to 100mg BID.  10/17: phoned in March, mood swings on LTG,  "planned change to LVT. Doing well with LVT 500mg bid. No seizures but as in past, couple times after running and dehydrated felt hard to think, but not like before seizure. Denies any significant mood or other SEs.   Today: doing well. Exercising regularly. No problems with LVT 500mg bid.  No sx of seizure at all. Sometimes sleep deprived, sometimes drinks alcohol, no near seizures. Now sees Dr Chambers at .     The following portions of the patient's history were reviewed today and updated as of 11/11/2019  : allergies, current medications, past family history, past medical history, past social history, past surgical history and problem list  These document will be scanned to patient's chart.      Current Outpatient Medications:     levETIRAcetam (KEPPRA) 1000 MG tablet, Take 1 tablet by mouth 2 (Two) Times a Day., Disp: 180 tablet, Rfl: 3    Inulin (FIBER CHOICE PO), Fiber (dextrin) 3 gram/3.5 gram oral powder  Take by oral route., Disp: , Rfl:     multivitamin with minerals tablet tablet, Take 1 tablet by mouth Daily., Disp: , Rfl:    Past Medical History:   Diagnosis Date    Seizures     Seizures 2013      Past Surgical History:   Procedure Laterality Date    WISDOM TOOTH EXTRACTION        Family History   Problem Relation Age of Onset    Dementia Maternal Grandmother     Dementia Maternal Grandfather       Social History     Socioeconomic History    Marital status: Single   Tobacco Use    Smoking status: Never     Passive exposure: Never    Smokeless tobacco: Never   Vaping Use    Vaping Use: Never used   Substance and Sexual Activity    Alcohol use: Not Currently     Comment: 1-2 a week or less    Drug use: Yes     Types: Marijuana     Comment: \"NOT RECENTLY\"    Sexual activity: Yes     Partners: Female     Birth control/protection: Condom     Review of Systems   All other systems reviewed and are negative.    Objective:    /76   Pulse 65   Ht 177.8 cm (70\")   Wt 73.6 kg (162 lb 3.2 oz)   SpO2 98%   " BMI 23.27 kg/mý     Neurology Exam:    General apperance: NAD.     Mental status: Alert, awake and oriented to time place and person.    Recent and Remote memory: Intact.    Attention span and Concentration: Normal.     Language and Speech: Intact- No dysarthria.    Fluency, Naming , Repitition and Comprehension:  Intact    Cranial Nerves:   CN II: Visual fields are full. Intact. Fundi - Normal, No papillederma, Pupils - TEAGAN  CN III, IV and VI: Extraocular movements are intact. Normal saccades.   CN V: Facial sensation is intact.   CN VII: Muscles of facial expression reveal no asymmetry. Intact.   CN VIII: Hearing is intact. Whispered voice intact.   CN IX and X: Palate elevates symmetrically. Intact  CN XI: Shoulder shrug is intact.   CN XII: Tongue is midline without evidence of atrophy or fasciculation.     Ophthalmoscopic exam of optic disc-normal    Motor:  Right UE muscle strength 5/5. Normal tone.     Left UE muscle strength 5/5. Normal tone.      Right LE muscle strength5/5. Normal tone.     Left LE muscle strength 5/5. Normal tone.      Sensory: Normal light touch, vibration and pinprick sensation bilaterally.    DTRs: 2+ bilaterally in upper and lower extremities.    Babinski: Negative bilaterally.    Co-ordination: Normal finger-to-nose, heel to shin B/L.    Rhomberg: Negative.    Gait: Normal.    Cardiovascular: Regular rate and rhythm without murmur, gallop or rub.    Assessment and Plan:  1.  Idiopathic generalized epilepsy (CMS/HCC)  Patient has generalized epilepsy.  In the past EEG has shown generalized spike-wave discharges.  MRI brain was normal  I have asked him to continue Keppra 1000 mg twice daily.  He has quit drinking alcohol altogether but I have also advised him to cut back on his caffeine intake (as he drinks a cup of coffee in addition to 2 sodas a day)  Counseled on seizure precautions       Return in about 1 year (around 8/31/2024).         Tracie Moran MD

## 2024-09-03 ENCOUNTER — OFFICE VISIT (OUTPATIENT)
Dept: NEUROLOGY | Facility: CLINIC | Age: 34
End: 2024-09-03
Payer: COMMERCIAL

## 2024-09-03 VITALS
WEIGHT: 156 LBS | BODY MASS INDEX: 22.33 KG/M2 | HEIGHT: 70 IN | HEART RATE: 68 BPM | SYSTOLIC BLOOD PRESSURE: 104 MMHG | DIASTOLIC BLOOD PRESSURE: 78 MMHG | OXYGEN SATURATION: 98 %

## 2024-09-03 DIAGNOSIS — G40.309 GENERALIZED EPILEPSY: Primary | ICD-10-CM

## 2024-09-03 PROCEDURE — 99213 OFFICE O/P EST LOW 20 MIN: CPT | Performed by: PSYCHIATRY & NEUROLOGY

## 2024-09-03 RX ORDER — LEVETIRACETAM 1000 MG/1
1000 TABLET ORAL 2 TIMES DAILY
Qty: 180 TABLET | Refills: 3 | Status: SHIPPED | OUTPATIENT
Start: 2024-09-03

## 2024-09-03 NOTE — PROGRESS NOTES
Subjective:    CC: Richie Alvares is seen today for Generalized epilepsy       HPI:  Current visit-patient denies having any seizures in the past year.  In fact his last seizure was in July of last year.  He continues to be compliant with Keppra 1000 mg twice daily.  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  Reports to sleeping well at night.  Also tries to keep himself well-hydrated (has cut back on his caffeine intake and has not had any alcohol since 2021).    Last visit-patient had 1 seizure while sleeping on 7/15.  He was on the couch when his girlfriend saw him have whole body shaking.  He denies any tongue bite or bowel/bladder incontinence.  Also denies missing any doses of Keppra or being sick/sleep deprived.  He has not had alcohol since 2021.  He went to the ER Keppra was increased to 1000 mg twice daily.  Blood work revealed an elevated lactic acid but otherwise electrolytes and U tox was unremarkable.      Last visit-patient denies having any seizures in the past year with either confusion or shaking.  His last seizure was in October 2021.  In fact he stopped drinking after that seizure and has not restarted it again.  He continues to take Keppra 750 mg twice daily.    Last visit-patient increased his dose of Keppra to 750 mg twice a day and is tolerating it well.  He denies having any seizures since his last visit.  In fact his last seizure was on Halloween 2021.  Prior to that it was in 2017.  He has quit drinking alcohol altogether since his last seizure.  Also tries to get adequate sleep at night.      Last visit-patient had one seizure on Halloween day with generalized tonic-clonic shaking.  Patient states he had been drinking heavily the day before as he went to a [employer] party.  He also feels that he may have missed his dose of Keppra that night.  The next day he was hung over and had thrown up twice.  Then in the evening when he sat down to eat food he seemed confused as per his  girlfriend who is with him today.  After that he let out a scream and stiffened up followed by whole body shaking.  The seizure lasted for slightly over a minute but he was confused for about 30 minutes afterwards.  He went to the ER where he had a CT scan of the head that was unremarkable and blood work including urine analysis that was normal.  He continues to take Keppra 500 mg twice a day.  Last seizure prior to that was in 2017.    Last visit-patient states he has not had any seizures since his last visit.  In fact his last grand mal seizure was in 2017.  He continues to take Keppra 500 mg twice a day.  He may take an extra dose if he feels jittery in the morning.  He moved into a new house 2 months ago and was stressed out as a result of that but did not feel any episodes come on.  Also denies having any episodes with staring or body jerks.  He does refrain from binge drinking now.    Last visit-this is a patient previously seen by Dr. Meng for seizures.  Patient had his first seizure in 2013.  With that episode he was under a lot of stress and was also sleep deprived.  Had been drinking 3 to 4 glasses of beer the day before.  Prior to the episode he felt slightly lightheaded and had trouble processing things.  After that he had whole body shaking.  Denies having any tongue bite or incontinence.  He was initially started on Dilantin and subsequently switched to Lamictal but reduced his dose to once a day as he was having some difficulty focusing.  Had his second seizure in 2017 in the early morning.  At that point he was completely off of Lamictal as he had been seizure-free.  After that Dr. Meng restarted lamotrigine however patient started having side effects hence she switched him to Keppra 500 mg twice a day that he has tolerated well.  Has not had any more seizures since 2017.  Does report to getting adequate sleep now.  Also avoids drinking alcohol but sometimes does binge drinking for example  this last Halloween he must have had 5-6 beers.  He had an MRI brain in the past at  that was normal and an EEG also done in 2013 that showed generalized spike and wave discharges and bursts.  Denies having any myoclonic jerks or episodes of staring.  There is no family history of seizures.  He also denies any febrile seizures as a child however did have a high fever at age 4.  Of note- I reviewed Dr. Meng's notes as follows-    Pt with first seizure 3/13. First seen by Dr. Ervin at . Brain MRI reviewed disc from 3/13: normal. EEG at [facility] 3/13 showed generalized spike and wave discharges in bursts.   The seizure occurred in setting of recent increased stress and sleep deprivation. Immediately before recalls having a little trouble processing. Had same feeling few times in past, when he was dehydrated and nearly passed out after running. Day prior to the seizure he had drunk about 24 ounces of beer which was not unusual for him. No TB or incontinence.  Born full term, no complications of pregnancy or delivery. When about [age], had very high fever, but no convulsion. No TBI with LOC. No CNS infection. No known family history of seizure, but father's side slightly less well known.  Started Dilantin, then planned to start LTG. no morning myoclonus.  At visit 10/8/13 was on LTG XR 400mg and tolerating well. Blood level 10/7/13 was 10. Very expensive. Switched to Lamictal 200 mg bid due to cost.  9/14: Good compliance -- misses no more than once/month. No seizure or prodrome.   7/15: No seizures. Felt like the LTG at higher dose was keeping mind running, and so cut back dose to 200mg, after forgetting dose couple days then taking dose and noticing SEs. Takes this immediate release tab in the morning. Seizures have occurred in the very early morning hours. Does not want to go off medicine at this time but prefers this lower dose.     2/17: was seizure free for approximately four years, discontinued  Lamictal around one year ago with no issue. On 2/10, he was seen at [facility] ED following a recurrent seizure. He notes that he had difficulty focusing and felt LH right before. Co-workers reported right arm stiffening and convulsions, duration unclear. Had gone out with friends drinking the night before. Restarted Lamictal at the ED and titrating up to 100mg BID.  10/17: phoned in March, mood swings on LTG, planned change to LVT. Doing well with LVT 500mg bid. No seizures but as in past, couple times after running and dehydrated felt hard to think, but not like before seizure. Denies any significant mood or other SEs.   Today: doing well. Exercising regularly. No problems with LVT 500mg bid.  No sx of seizure at all. Sometimes sleep deprived, sometimes drinks alcohol, no near seizures. Now sees Dr Chambers at .     The following portions of the patient's history were reviewed today and updated as of 11/11/2019  : allergies, current medications, past family history, past medical history, past social history, past surgical history and problem list  These document will be scanned to patient's chart.      Current Outpatient Medications:     levETIRAcetam (KEPPRA) 1000 MG tablet, Take 1 tablet by mouth 2 (Two) Times a Day., Disp: 180 tablet, Rfl: 3    Inulin (FIBER CHOICE PO), Fiber (dextrin) 3 gram/3.5 gram oral powder  Take by oral route., Disp: , Rfl:     multivitamin with minerals tablet tablet, Take 1 tablet by mouth Daily., Disp: , Rfl:    Past Medical History:   Diagnosis Date    Seizures     Seizures 2013      Past Surgical History:   Procedure Laterality Date    WISDOM TOOTH EXTRACTION        Family History   Problem Relation Age of Onset    Dementia Maternal Grandmother     Dementia Maternal Grandfather       Social History     Socioeconomic History    Marital status: Single   Tobacco Use    Smoking status: Never     Passive exposure: Never    Smokeless tobacco: Never   Vaping Use    Vaping status: Never Used  "  Substance and Sexual Activity    Alcohol use: Not Currently     Comment: 1-2 a week or less    Drug use: Yes     Types: Marijuana     Comment: \"NOT RECENTLY\"    Sexual activity: Yes     Partners: Female     Birth control/protection: Condom     Review of Systems   All other systems reviewed and are negative.      Objective:    /78   Pulse 68   Ht 177.8 cm (70\")   Wt 70.8 kg (156 lb)   SpO2 98%   BMI 22.38 kg/m²     Neurology Exam:    General apperance: NAD.     Mental status: Alert, awake and oriented to time place and person.    Recent and Remote memory: Intact.    Attention span and Concentration: Normal.     Language and Speech: Intact- No dysarthria.    Fluency, Naming , Repitition and Comprehension:  Intact    Cranial Nerves:   CN II: Visual fields are full. Intact. Fundi - Normal, No papillederma, Pupils - TEAGAN  CN III, IV and VI: Extraocular movements are intact. Normal saccades.   CN V: Facial sensation is intact.   CN VII: Muscles of facial expression reveal no asymmetry. Intact.   CN VIII: Hearing is intact. Whispered voice intact.   CN IX and X: Palate elevates symmetrically. Intact  CN XI: Shoulder shrug is intact.   CN XII: Tongue is midline without evidence of atrophy or fasciculation.     Ophthalmoscopic exam of optic disc-normal    Motor:  Right UE muscle strength 5/5. Normal tone.     Left UE muscle strength 5/5. Normal tone.      Right LE muscle strength5/5. Normal tone.     Left LE muscle strength 5/5. Normal tone.      Sensory: Normal light touch, vibration and pinprick sensation bilaterally.    DTRs: 2+ bilaterally in upper and lower extremities.    Babinski: Negative bilaterally.    Co-ordination: Normal finger-to-nose, heel to shin B/L.    Rhomberg: Negative.    Gait: Normal.    Cardiovascular: Regular rate and rhythm without murmur, gallop or rub.    Assessment and Plan:  1.  Idiopathic generalized epilepsy (CMS/HCC)  Patient has generalized epilepsy.  In the past EEG has shown " generalized spike-wave discharges.  MRI brain was normal  I have asked him to continue Keppra 1000 mg twice daily.  He should continue keeping himself well-hydrated  Counseled on seizure precautions and medication compliance       Return in about 1 year (around 9/3/2025).         Tracie Moran MD

## 2025-06-10 ENCOUNTER — APPOINTMENT (OUTPATIENT)
Dept: CT IMAGING | Facility: HOSPITAL | Age: 35
End: 2025-06-10
Payer: COMMERCIAL

## 2025-06-10 ENCOUNTER — HOSPITAL ENCOUNTER (EMERGENCY)
Facility: HOSPITAL | Age: 35
Discharge: HOME | End: 2025-06-11
Attending: EMERGENCY MEDICINE | Admitting: EMERGENCY MEDICINE
Payer: COMMERCIAL

## 2025-06-10 DIAGNOSIS — J01.20 ACUTE NON-RECURRENT ETHMOIDAL SINUSITIS: ICD-10-CM

## 2025-06-10 DIAGNOSIS — G40.909 NONINTRACTABLE EPILEPSY WITHOUT STATUS EPILEPTICUS, UNSPECIFIED EPILEPSY TYPE: ICD-10-CM

## 2025-06-10 DIAGNOSIS — G40.919 BREAKTHROUGH SEIZURE: Primary | ICD-10-CM

## 2025-06-10 LAB
ALBUMIN SERPL-MCNC: 4.3 G/DL (ref 3.5–5.2)
ALBUMIN/GLOB SERPL: 1.7 G/DL
ALP SERPL-CCNC: 96 U/L (ref 39–117)
ALT SERPL W P-5'-P-CCNC: 26 U/L (ref 1–41)
ANION GAP SERPL CALCULATED.3IONS-SCNC: 12 MMOL/L (ref 5–15)
AST SERPL-CCNC: 27 U/L (ref 1–40)
BASOPHILS # BLD AUTO: 0.02 10*3/MM3 (ref 0–0.2)
BASOPHILS NFR BLD AUTO: 0.3 % (ref 0–1.5)
BILIRUB SERPL-MCNC: 0.3 MG/DL (ref 0–1.2)
BUN SERPL-MCNC: 11.7 MG/DL (ref 6–20)
BUN/CREAT SERPL: 12.4 (ref 7–25)
CALCIUM SPEC-SCNC: 9.4 MG/DL (ref 8.6–10.5)
CHLORIDE SERPL-SCNC: 101 MMOL/L (ref 98–107)
CO2 SERPL-SCNC: 25 MMOL/L (ref 22–29)
CREAT SERPL-MCNC: 0.94 MG/DL (ref 0.76–1.27)
DEPRECATED RDW RBC AUTO: 36.2 FL (ref 37–54)
EGFRCR SERPLBLD CKD-EPI 2021: 108.4 ML/MIN/1.73
EOSINOPHIL # BLD AUTO: 0.12 10*3/MM3 (ref 0–0.4)
EOSINOPHIL NFR BLD AUTO: 2 % (ref 0.3–6.2)
ERYTHROCYTE [DISTWIDTH] IN BLOOD BY AUTOMATED COUNT: 11.9 % (ref 12.3–15.4)
GLOBULIN UR ELPH-MCNC: 2.6 GM/DL
GLUCOSE SERPL-MCNC: 95 MG/DL (ref 65–99)
HCT VFR BLD AUTO: 40.4 % (ref 37.5–51)
HGB BLD-MCNC: 13.9 G/DL (ref 13–17.7)
IMM GRANULOCYTES # BLD AUTO: 0.02 10*3/MM3 (ref 0–0.05)
IMM GRANULOCYTES NFR BLD AUTO: 0.3 % (ref 0–0.5)
LYMPHOCYTES # BLD AUTO: 1.78 10*3/MM3 (ref 0.7–3.1)
LYMPHOCYTES NFR BLD AUTO: 29 % (ref 19.6–45.3)
MCH RBC QN AUTO: 29.1 PG (ref 26.6–33)
MCHC RBC AUTO-ENTMCNC: 34.4 G/DL (ref 31.5–35.7)
MCV RBC AUTO: 84.5 FL (ref 79–97)
MONOCYTES # BLD AUTO: 0.46 10*3/MM3 (ref 0.1–0.9)
MONOCYTES NFR BLD AUTO: 7.5 % (ref 5–12)
NEUTROPHILS NFR BLD AUTO: 3.74 10*3/MM3 (ref 1.7–7)
NEUTROPHILS NFR BLD AUTO: 60.9 % (ref 42.7–76)
NRBC BLD AUTO-RTO: 0 /100 WBC (ref 0–0.2)
PLATELET # BLD AUTO: 133 10*3/MM3 (ref 140–450)
PMV BLD AUTO: 11.9 FL (ref 6–12)
POTASSIUM SERPL-SCNC: 3.9 MMOL/L (ref 3.5–5.2)
PROT SERPL-MCNC: 6.9 G/DL (ref 6–8.5)
QT INTERVAL: 360 MS
QTC INTERVAL: 438 MS
RBC # BLD AUTO: 4.78 10*6/MM3 (ref 4.14–5.8)
SODIUM SERPL-SCNC: 138 MMOL/L (ref 136–145)
WBC NRBC COR # BLD AUTO: 6.14 10*3/MM3 (ref 3.4–10.8)

## 2025-06-10 PROCEDURE — 93005 ELECTROCARDIOGRAM TRACING: CPT | Performed by: EMERGENCY MEDICINE

## 2025-06-10 PROCEDURE — 80053 COMPREHEN METABOLIC PANEL: CPT | Performed by: EMERGENCY MEDICINE

## 2025-06-10 PROCEDURE — 99284 EMERGENCY DEPT VISIT MOD MDM: CPT

## 2025-06-10 PROCEDURE — 85025 COMPLETE CBC W/AUTO DIFF WBC: CPT | Performed by: EMERGENCY MEDICINE

## 2025-06-10 PROCEDURE — 70450 CT HEAD/BRAIN W/O DYE: CPT

## 2025-06-11 VITALS
SYSTOLIC BLOOD PRESSURE: 115 MMHG | HEIGHT: 70 IN | TEMPERATURE: 98.2 F | RESPIRATION RATE: 16 BRPM | DIASTOLIC BLOOD PRESSURE: 69 MMHG | OXYGEN SATURATION: 96 % | HEART RATE: 71 BPM | WEIGHT: 160 LBS | BODY MASS INDEX: 22.9 KG/M2

## 2025-06-11 NOTE — DISCHARGE INSTRUCTIONS
Continue taking your seizure medication as prescribed.  Call your neurologist to schedule follow-up appointment.  Return to the ER as needed for new or worsening symptoms.  You should not drive or operate machinery for the next 6 months and you should use an abundance of caution in regards to swimming or other situations where it would be dangerous to have a seizure.

## 2025-06-11 NOTE — ED PROVIDER NOTES
[place]    EMERGENCY DEPARTMENT ENCOUNTER      Pt Name: Richie Alvares  MRN: [id number]  YOB: 1990  Date of evaluation: 6/10/2025  Provider: Jonas Rust MD    CHIEF COMPLAINT       Chief Complaint   Patient presents with    Seizures         HISTORY OF PRESENT ILLNESS   Richie Alvares is a 35 y.o. male who presents to the emergency department for evaluation of a breakthrough seizure that occurred tonight while he was sleeping on the couch with his significant other.  He has a known history of seizure disorder and epilepsy and is followed by neurology.  He is on Keppra 1000 mg twice daily has not had any recent changes in that medication or missed doses.  He states he has generally been feeling well over the past few days but maybe has had a little bit of runny nose and sinus pressure, no fevers or chills.    Patient significant other reports that his seizure was generalized tonic-clonic, lasting about 4 minutes and then terminated at home.  This is his first seizure in a couple of years.    REVIEW OF SYSTEMS     ROS:  A chief complaint appropriate review of systems was completed and is negative except as noted in the no alcohol or drug use HPI.      PAST MEDICAL HISTORY     Past Medical History:   Diagnosis Date    Seizures     Seizures 2013         SURGICAL HISTORY       Past Surgical History:   Procedure Laterality Date    WISDOM TOOTH EXTRACTION           CURRENT MEDICATIONS     No current facility-administered medications for this encounter.    Current Outpatient Medications:     Inulin (FIBER CHOICE PO), Fiber (dextrin) 3 gram/3.5 gram oral powder  Take by oral route., Disp: , Rfl:     levETIRAcetam (KEPPRA) 1000 MG tablet, Take 1 tablet by mouth 2 (Two) Times a Day., Disp: 180 tablet, Rfl: 3    multivitamin with minerals tablet tablet, Take 1 tablet by mouth Daily., Disp: , Rfl:     ALLERGIES     Patient has no known allergies.    FAMILY HISTORY       Family History   Problem Relation Age of  "Onset    Dementia Maternal Grandmother     Dementia Maternal Grandfather           SOCIAL HISTORY       Social History     Socioeconomic History    Marital status: Single   Tobacco Use    Smoking status: Never     Passive exposure: Never    Smokeless tobacco: Never   Vaping Use    Vaping status: Never Used   Substance and Sexual Activity    Alcohol use: Not Currently     Comment: 1-2 a week or less    Drug use: Yes     Types: Marijuana     Comment: \"NOT RECENTLY\"    Sexual activity: Yes     Partners: Female     Birth control/protection: Condom         PHYSICAL EXAM    (up to 7 for level 4, 8 or more for level 5)     Vitals:    06/10/25 2300 06/10/25 2330 06/11/25 0000 06/11/25 0030   BP: 120/69 103/66 106/71 112/74   BP Location:       Patient Position:       Pulse: 87 82 68 70   Resp:       Temp:       TempSrc:       SpO2: 95% 95% 96% 96%   Weight:       Height:           Physical Exam  Constitutional:       General: He is not in acute distress.  HENT:      Head: Normocephalic and atraumatic.   Eyes:      Conjunctiva/sclera: Conjunctivae normal.      Pupils: Pupils are equal, round, and reactive to light.   Cardiovascular:      Rate and Rhythm: Normal rate and regular rhythm.      Pulses: Normal pulses.      Heart sounds: No murmur heard.     No gallop.   Pulmonary:      Effort: Pulmonary effort is normal. No respiratory distress.   Abdominal:      General: Abdomen is flat. There is no distension.      Tenderness: There is no abdominal tenderness.   Musculoskeletal:         General: No swelling or deformity. Normal range of motion.   Skin:     General: Skin is warm and dry.      Capillary Refill: Capillary refill takes less than 2 seconds.   Neurological:      General: No focal deficit present.      Mental Status: He is alert and oriented to person, place, and time.      Comments: GCS 15.  Cranial Nerves II-XII intact without deficit.  Strength 5/5 in the bilateral upper extremities.  Strength 5/5 in the bilateral " lower extremities.  Sensation to light touch intact throughout.  Cerebellar function intact via finger-nose-finger.     Psychiatric:         Mood and Affect: Mood normal.         Behavior: Behavior normal.            DIAGNOSTIC RESULTS     EKG: All EKGs are interpreted by the Emergency Department Physician who either signs or Co-signs this chart in the absence of a cardiologist.    ECG 12 Lead Syncope   Final Result   Test Reason : Syncope   Blood Pressure :   */*   mmHG   Vent. Rate :  89 BPM     Atrial Rate :  89 BPM      P-R Int : 168 ms          QRS Dur :  92 ms       QT Int : 360 ms       P-R-T Axes :  71  69  55 degrees     QTcB Int : 438 ms      Normal sinus rhythm   Abnormal ECG   No previous ECGs available   Confirmed by MD VORA KYLE ([id number]) on 6/10/2025 11:02:34 PM      Referred By: EDMD           Confirmed By: JORDON VORA MD            RADIOLOGY:   [x] Radiologist's Report Reviewed:  CT Head Without Contrast   Final Result   Impression:   1.No acute intracranial abnormality.   2.Mild left ethmoid sinus mucosal disease.                  Electronically Signed: Earnest Forrest MD     6/11/2025 12:42 AM EDT     Workstation ID: [id number]          I ordered and independently reviewed the above noted radiographic studies.        LABS:  I independently interpreted all laboratory studies conducted during this ED visit.  The results of these studies can be seen below and my independent interpretation in the ED course      EMERGENCY DEPARTMENT COURSE and DIFFERENTIAL DIAGNOSIS/MDM:   Vitals:  AS OF 00:53 EDT    BP - 112/74  HR - 70  TEMP - 98.2 °F (36.8 °C) (Oral)  O2 SATS - 96%        Discussion below represents my analysis of pertinent findings related to patient's condition, differential diagnosis, treatment plan and final disposition.      Differential diagnosis:  The differential diagnosis associated with the patient's presentation includes: Breakthrough seizure, intracranial abnormality, hyponatremia,  infectious etiology as a cause for lowering of seizure threshold      Independent interpretations (ECG/rhythm strip/X-ray/US/CT scan): See ED course      Additional sources:  Discussed/obtained information from independent historians:   [x] Spouse:   [] Parent:   [] Friend:   [] EMS:   [] Other:    External record review:  9/3/2024 reviewed most recent outpatient neurology note, seen for generalized epilepsy, on Keppra 1000 mg twice daily, last seizure greater than a year ago      Patient's care impacted by:   [] Diabetes   [] Hypertension   [] Coronary Artery Disease   [] Cancer   [x] Other: Epilepsy    Care significantly affected by Social Determinants of Health (housing and economic circumstances, unemployment)    [] Yes     [x] No   If yes, Patient's care significantly limited by  Social Determinants of Health including:    [] Inadequate housing    [] Low income    [] Alcoholism and drug addiction in family    [] Problems related to primary support group    [] Unemployment    [] Problems related to employment    [] Other Social Determinants of Health:     I considered prescription management with:    [] Pain medication:   [] Antiviral:   [] Antibiotic:   [] Other:    Additional orders considered but not ordered:  The following testing was considered but ultimately not selected:     ED Course:    ED Course as of 06/11/25 0053   Tue Sal 10, 2025   2302 Twelve-lead ECG independently interpreted by myself demonstrates normal sinus rhythm,  there is subtle widespread ST segment elevation consistent with benign early repolarization.  Normal axis [KB]   Wed Jun 11, 2025   0027 CT scan of the head independently interpreted by myself demonstrates no acute intracranial abnormality.  Radiologist notes ethmoid sinus disease [KB]      ED Course User Index  [KB] Jonas Rust MD         Diagnostic studies were conducted in the ER.  CT scan notable only for ethmoid sinus disease which would be a reasonable explanation for a  transient lowering of his seizure threshold and breakthrough seizure.  Patient has no recurrent seizure-like activity in the ER, has a normal neurologic examination, normal vital signs and is appropriate for outpatient follow-up with his neurologist.  He will continue his seizure medication.  He does not require antibiotics for his sinusitis.  He was counseled on seizure precautions and not driving.    Prior to discharge from the emergency department I discussed with the patient and any family members present the current diagnosis, treatment plan, recommendations regarding follow-up with a primary care doctor or specialist, general emergency room return precautions as well as return precautions specific to their diagnosis and treatment.  The patient/family indicated understanding of these instructions.  Time was allotted to answer questions at that time and throughout the ED course.         PROCEDURES:  Procedures    CRITICAL CARE TIME        CONSULTS       FINAL IMPRESSION      1. Breakthrough seizure    2. Nonintractable epilepsy without status epilepticus, unspecified epilepsy type    3. Acute non-recurrent ethmoidal sinusitis          DISPOSITION/PLAN     ED Disposition       ED Disposition   Discharge    Condition   Stable    Comment   --                 Comment: Please note this report has been produced using speech recognition software.      Jonas Rust MD  Attending Emergency Physician    Recent Results (from the past 24 hours)   ECG 12 Lead Syncope    Collection Time: 06/10/25 11:01 PM   Result Value Ref Range    QT Interval 360 ms    QTC Interval 438 ms   CBC Auto Differential    Collection Time: 06/10/25 11:28 PM    Specimen: Blood   Result Value Ref Range    WBC 6.14 3.40 - 10.80 10*3/mm3    RBC 4.78 4.14 - 5.80 10*6/mm3    Hemoglobin 13.9 13.0 - 17.7 g/dL    Hematocrit 40.4 37.5 - 51.0 %    MCV 84.5 79.0 - 97.0 fL    MCH 29.1 26.6 - 33.0 pg    MCHC 34.4 31.5 - 35.7 g/dL    RDW 11.9 (L) 12.3 - 15.4 %     RDW-SD 36.2 (L) 37.0 - 54.0 fl    MPV 11.9 6.0 - 12.0 fL    Platelets 133 (L) 140 - 450 10*3/mm3    Neutrophil % 60.9 42.7 - 76.0 %    Lymphocyte % 29.0 19.6 - 45.3 %    Monocyte % 7.5 5.0 - 12.0 %    Eosinophil % 2.0 0.3 - 6.2 %    Basophil % 0.3 0.0 - 1.5 %    Immature Grans % 0.3 0.0 - 0.5 %    Neutrophils, Absolute 3.74 1.70 - 7.00 10*3/mm3    Lymphocytes, Absolute 1.78 0.70 - 3.10 10*3/mm3    Monocytes, Absolute 0.46 0.10 - 0.90 10*3/mm3    Eosinophils, Absolute 0.12 0.00 - 0.40 10*3/mm3    Basophils, Absolute 0.02 0.00 - 0.20 10*3/mm3    Immature Grans, Absolute 0.02 0.00 - 0.05 10*3/mm3    nRBC 0.0 0.0 - 0.2 /100 WBC   Comprehensive Metabolic Panel    Collection Time: 06/10/25 11:28 PM    Specimen: Blood   Result Value Ref Range    Glucose 95 65 - 99 mg/dL    BUN 11.7 6.0 - 20.0 mg/dL    Creatinine 0.94 0.76 - 1.27 mg/dL    Sodium 138 136 - 145 mmol/L    Potassium 3.9 3.5 - 5.2 mmol/L    Chloride 101 98 - 107 mmol/L    CO2 25.0 22.0 - 29.0 mmol/L    Calcium 9.4 8.6 - 10.5 mg/dL    Total Protein 6.9 6.0 - 8.5 g/dL    Albumin 4.3 3.5 - 5.2 g/dL    ALT (SGPT) 26 1 - 41 U/L    AST (SGOT) 27 1 - 40 U/L    Alkaline Phosphatase 96 39 - 117 U/L    Total Bilirubin 0.3 0.0 - 1.2 mg/dL    Globulin 2.6 gm/dL    A/G Ratio 1.7 g/dL    BUN/Creatinine Ratio 12.4 7.0 - 25.0    Anion Gap 12.0 5.0 - 15.0 mmol/L    eGFR 108.4 >60.0 mL/min/1.73     Note: In addition to lab results from this visit, the labs listed above may include labs taken at another facility or during a different encounter within the last 24 hours. Please correlate lab times with ED admission and discharge times for further clarification of the services performed during this visit.                 Jonas Rust MD  06/11/25 0053

## 2025-06-12 ENCOUNTER — TELEPHONE (OUTPATIENT)
Dept: NEUROLOGY | Facility: CLINIC | Age: 35
End: 2025-06-12
Payer: COMMERCIAL

## 2025-06-12 DIAGNOSIS — G40.309 GENERALIZED EPILEPSY: Primary | ICD-10-CM

## 2025-06-12 NOTE — TELEPHONE ENCOUNTER
Pt returned call, unable to make the sooner 6/16 appt as he will be out of town and will not return until Friday June 20th. He can make an appt usually with same day or 1 day notice so will keep him on wait list for a sooner appt. Also confirmed he can go to any [facility] lab, that we have one at our facility and also across the street at the  primary care/urgent care. He will be sure to get the lab 4-5 hours after morning dose. I confirmed his blood work in the hospital recently did not test for Keppra so he will need blood redrawn. Cancelled June appt and kept July appt as is.

## 2025-06-12 NOTE — TELEPHONE ENCOUNTER
Caller: Richie Alvares    Relationship: Self    Best call back number: [phone]     What was the call regarding: THE PT CALLED AND SCHEDULED AN EARLIER APPT DUE TO HER HAD A BREAK THROUGH SEIZURE ON 6/10/25. HE DID GO TO THE ED. NOTES ARE IN THE CHART     PLEASE REVIEW  THANK YOU

## 2025-06-12 NOTE — TELEPHONE ENCOUNTER
"LV      Relay:\" Dr. Moran wants to see you earlier so we have scheduled you to be seen 6/16/25 at 1:45.  If you are unable to make this appointment please call our office at [phone].  She also wants you to get a Keppra level drawn. It needs to be done about 4 to 5 hours after taking your morning dose of Keppra. YOu can come to our lab on any day and have that done. She will adjust your dose of Keppra accordingly after receiving results.\"         Delete 7/16/25 appt if patient shows for 6/16/25 appt  "

## 2025-06-26 ENCOUNTER — LAB (OUTPATIENT)
Dept: LAB | Facility: HOSPITAL | Age: 35
End: 2025-06-26
Payer: COMMERCIAL

## 2025-06-26 DIAGNOSIS — G40.309 GENERALIZED EPILEPSY: ICD-10-CM

## 2025-06-26 PROCEDURE — 36415 COLL VENOUS BLD VENIPUNCTURE: CPT

## 2025-06-26 PROCEDURE — 80177 DRUG SCRN QUAN LEVETIRACETAM: CPT

## 2025-06-29 LAB — LEVETIRACETAM SERPL-MCNC: 24.4 UG/ML (ref 10–40)

## 2025-07-16 ENCOUNTER — OFFICE VISIT (OUTPATIENT)
Dept: NEUROLOGY | Facility: CLINIC | Age: 35
End: 2025-07-16
Payer: COMMERCIAL

## 2025-07-16 VITALS
WEIGHT: 155 LBS | HEART RATE: 77 BPM | SYSTOLIC BLOOD PRESSURE: 106 MMHG | HEIGHT: 70 IN | OXYGEN SATURATION: 98 % | DIASTOLIC BLOOD PRESSURE: 70 MMHG | BODY MASS INDEX: 22.19 KG/M2

## 2025-07-16 DIAGNOSIS — G40.309 GENERALIZED EPILEPSY: Primary | ICD-10-CM

## 2025-07-16 PROCEDURE — 99214 OFFICE O/P EST MOD 30 MIN: CPT | Performed by: PSYCHIATRY & NEUROLOGY

## 2025-07-16 RX ORDER — LEVETIRACETAM 1000 MG/1
TABLET ORAL
Qty: 225 TABLET | Refills: 3 | Status: SHIPPED | OUTPATIENT
Start: 2025-07-16

## 2025-07-16 RX ORDER — HYDROCORTISONE 25 MG/G
OINTMENT TOPICAL AS NEEDED
COMMUNITY
Start: 2025-06-05

## 2025-07-16 RX ORDER — TRIAMCINOLONE ACETONIDE 1 MG/G
OINTMENT TOPICAL
COMMUNITY
Start: 2025-06-05

## 2025-07-16 NOTE — PROGRESS NOTES
Subjective:    CC: Richie Alvares is seen today for Follow-up (1 yr)       HPI:  Current visit-patient had a seizure on 6/10/2025.  Prior to that his seizure had been 2 years ago in July 2023.  During the spell he was laying on the couch taking a nap when all of a sudden he had an episode of stiffening and whole body shaking.  He cannot think of any provoking factors as he denies being sick, sleep deprived or missing his medications.  A week prior to the episode he had taken a 10-day trip to [place] but reports to being compliant with his medications even during the trip.  He was taken to the ER where he had a CT head that showed mild left ethmoidal sinusitis but no acute intracranial abnormalities.  Blood work was unremarkable other than a slightly low platelet count of 133.  He later had a Keppra level that was 24.4 (normal 10-40).    Of note-I personally reviewed his CT head in the ER notes     Last visit-patient denies having any seizures in the past year.  In fact his last seizure was in July of last year.  He continues to be compliant with Keppra 1000 mg twice daily.  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  Reports to sleeping well at night.  Also tries to keep himself well-hydrated (has cut back on his caffeine intake and has not had any alcohol since 2021).    Last visit-patient had 1 seizure while sleeping on 7/15.  He was on the couch when his girlfriend saw him have whole body shaking.  He denies any tongue bite or bowel/bladder incontinence.  Also denies missing any doses of Keppra or being sick/sleep deprived.  He has not had alcohol since 2021.  He went to the ER Keppra was increased to 1000 mg twice daily.  Blood work revealed an elevated lactic acid but otherwise electrolytes and U tox was unremarkable.      Last visit-patient denies having any seizures in the past year with either confusion or shaking.  His last seizure was in October 2021.  In fact he stopped drinking after that seizure and  has not restarted it again.  He continues to take Keppra 750 mg twice daily.    Last visit-patient increased his dose of Keppra to 750 mg twice a day and is tolerating it well.  He denies having any seizures since his last visit.  In fact his last seizure was on Halloween 2021.  Prior to that it was in 2017.  He has quit drinking alcohol altogether since his last seizure.  Also tries to get adequate sleep at night.      Last visit-patient had one seizure on Halloween day with generalized tonic-clonic shaking.  Patient states he had been drinking heavily the day before as he went to a [employer] party.  He also feels that he may have missed his dose of Keppra that night.  The next day he was hung over and had thrown up twice.  Then in the evening when he sat down to eat food he seemed confused as per his girlfriend who is with him today.  After that he let out a scream and stiffened up followed by whole body shaking.  The seizure lasted for slightly over a minute but he was confused for about 30 minutes afterwards.  He went to the ER where he had a CT scan of the head that was unremarkable and blood work including urine analysis that was normal.  He continues to take Keppra 500 mg twice a day.  Last seizure prior to that was in 2017.    Last visit-patient states he has not had any seizures since his last visit.  In fact his last grand mal seizure was in 2017.  He continues to take Keppra 500 mg twice a day.  He may take an extra dose if he feels jittery in the morning.  He moved into a new house 2 months ago and was stressed out as a result of that but did not feel any episodes come on.  Also denies having any episodes with staring or body jerks.  He does refrain from binge drinking now.    Last visit-this is a patient previously seen by Dr. Meng for seizures.  Patient had his first seizure in 2013.  With that episode he was under a lot of stress and was also sleep deprived.  Had been drinking 3 to 4 glasses of  beer the day before.  Prior to the episode he felt slightly lightheaded and had trouble processing things.  After that he had whole body shaking.  Denies having any tongue bite or incontinence.  He was initially started on Dilantin and subsequently switched to Lamictal but reduced his dose to once a day as he was having some difficulty focusing.  Had his second seizure in 2017 in the early morning.  At that point he was completely off of Lamictal as he had been seizure-free.  After that Dr. Meng restarted lamotrigine however patient started having side effects hence she switched him to Keppra 500 mg twice a day that he has tolerated well.  Has not had any more seizures since 2017.  Does report to getting adequate sleep now.  Also avoids drinking alcohol but sometimes does binge drinking for example this last Halloween he must have had 5-6 beers.  He had an MRI brain in the past at  that was normal and an EEG also done in 2013 that showed generalized spike and wave discharges and bursts.  Denies having any myoclonic jerks or episodes of staring.  There is no family history of seizures.  He also denies any febrile seizures as a child however did have a high fever at age 4.  Of note- I reviewed Dr. Meng's notes as follows-    Pt with first seizure 3/13. First seen by Dr. Ervin at . Brain MRI reviewed disc from 3/13: normal. EEG at [facility]/  3/13 showed generalized spike and wave discharges in bursts.   The seizure occurred in setting of recent increased stress and sleep deprivation. Immediately before recalls having a little trouble processing. Had same feeling few times in past, when he was dehydrated and nearly passed out after running. Day prior to the seizure he had drunk about 24 ounces of beer which was not unusual for him. No TB or incontinence.  Born full term, no complications of pregnancy or delivery. When about [age], had very high fever, but no convulsion. No TBI with LOC. No CNS  infection. No known family history of seizure, but father's side slightly less well known.  Started Dilantin, then planned to start LTG. no morning myoclonus.  At visit 10/8/13 was on LTG XR 400mg and tolerating well. Blood level 10/7/13 was 10. Very expensive. Switched to Lamictal 200 mg bid due to cost.  9/14: Good compliance -- misses no more than once/month. No seizure or prodrome.   7/15: No seizures. Felt like the LTG at higher dose was keeping mind running, and so cut back dose to 200mg, after forgetting dose couple days then taking dose and noticing SEs. Takes this immediate release tab in the morning. Seizures have occurred in the very early morning hours. Does not want to go off medicine at this time but prefers this lower dose.     2/17: was seizure free for approximately four years, discontinued Lamictal around one year ago with no issue. On 2/10, he was seen at [facility] ED following a recurrent seizure. He notes that he had difficulty focusing and felt LH right before. Co-workers reported right arm stiffening and convulsions, duration unclear. Had gone out with friends drinking the night before. Restarted Lamictal at the ED and titrating up to 100mg BID.  10/17: phoned in March, mood swings on LTG, planned change to LVT. Doing well with LVT 500mg bid. No seizures but as in past, couple times after running and dehydrated felt hard to think, but not like before seizure. Denies any significant mood or other SEs.   Today: doing well. Exercising regularly. No problems with LVT 500mg bid.  No sx of seizure at all. Sometimes sleep deprived, sometimes drinks alcohol, no near seizures. Now sees Dr Chambers at .     The following portions of the patient's history were reviewed today and updated as of 11/11/2019  : allergies, current medications, past family history, past medical history, past social history, past surgical history and problem list  These document will be scanned to patient's chart.      Current  "Outpatient Medications:     hydrocortisone 2.5 % ointment, As Needed. Please see attached for detailed directions, Disp: , Rfl:     Inulin (FIBER CHOICE PO), Fiber (dextrin) 3 gram/3.5 gram oral powder  Take by oral route., Disp: , Rfl:     levETIRAcetam (KEPPRA) 1000 MG tablet, Take 1 tablet in the morning and 1-1/2 tablets at night, Disp: 225 tablet, Rfl: 3    multivitamin with minerals tablet tablet, Take 1 tablet by mouth Daily., Disp: , Rfl:     triamcinolone (KENALOG) 0.1 % ointment, Please see attached for detailed directions, Disp: , Rfl:    Past Medical History:   Diagnosis Date    Seizures     Seizures 2013      Past Surgical History:   Procedure Laterality Date    WISDOM TOOTH EXTRACTION        Family History   Problem Relation Age of Onset    Dementia Maternal Grandmother     Dementia Maternal Grandfather       Social History     Socioeconomic History    Marital status: Single   Tobacco Use    Smoking status: Never     Passive exposure: Never    Smokeless tobacco: Never   Vaping Use    Vaping status: Never Used   Substance and Sexual Activity    Alcohol use: Not Currently     Comment: 1-2 a week or less    Drug use: Yes     Types: Marijuana     Comment: \"NOT RECENTLY\"    Sexual activity: Yes     Partners: Female     Birth control/protection: Condom     Review of Systems   All other systems reviewed and are negative.      Objective:    /70   Pulse 77   Ht 177.8 cm (70\")   Wt 70.3 kg (155 lb)   SpO2 98%   BMI 22.24 kg/m²     Neurology Exam:    General apperance: NAD.     Mental status: Alert, awake and oriented to time place and person.    Recent and Remote memory: Intact.    Attention span and Concentration: Normal.     Language and Speech: Intact- No dysarthria.    Fluency, Naming , Repitition and Comprehension:  Intact    Cranial Nerves:   CN II: Visual fields are full. Intact. Fundi - Normal, No papillederma, Pupils - TEAGAN  CN III, IV and VI: Extraocular movements are intact. Normal " saccades.   CN V: Facial sensation is intact.   CN VII: Muscles of facial expression reveal no asymmetry. Intact.   CN VIII: Hearing is intact. Whispered voice intact.   CN IX and X: Palate elevates symmetrically. Intact  CN XI: Shoulder shrug is intact.   CN XII: Tongue is midline without evidence of atrophy or fasciculation.     Ophthalmoscopic exam of optic disc-normal    Motor:  Right UE muscle strength 5/5. Normal tone.     Left UE muscle strength 5/5. Normal tone.      Right LE muscle strength5/5. Normal tone.     Left LE muscle strength 5/5. Normal tone.      Sensory: Normal light touch, vibration and pinprick sensation bilaterally.    DTRs: 2+ bilaterally in upper and lower extremities.    Babinski: Negative bilaterally.    Co-ordination: Normal finger-to-nose, heel to shin B/L.    Rhomberg: Negative.    Gait: Normal.    Cardiovascular: Regular rate and rhythm without murmur, gallop or rub.    Assessment and Plan:  1.  Idiopathic generalized epilepsy (CMS/HCC)  Patient has generalized epilepsy.  In the past EEG has shown generalized spike-wave discharges.  MRI brain was normal  He had an unprovoked seizure on 6/10/2025  I will increase his Keppra slightly.  He will continue taking a dose of 1000 mg during the daytime and increase his night dose to 1500 mg  He should continue keeping himself well-hydrated  Counseled on seizure precautions and medication compliance       Return in about 6 months (around 1/16/2026).     I spent 24 minutes out of which 18 minutes was spent face-to-face    Tracie Moran MD